# Patient Record
Sex: MALE | Race: WHITE | NOT HISPANIC OR LATINO | Employment: PART TIME | URBAN - METROPOLITAN AREA
[De-identification: names, ages, dates, MRNs, and addresses within clinical notes are randomized per-mention and may not be internally consistent; named-entity substitution may affect disease eponyms.]

---

## 2023-06-22 ENCOUNTER — OFFICE VISIT (OUTPATIENT)
Dept: FAMILY MEDICINE CLINIC | Facility: HOSPITAL | Age: 33
End: 2023-06-22
Payer: COMMERCIAL

## 2023-06-22 VITALS
WEIGHT: 162.6 LBS | BODY MASS INDEX: 22.02 KG/M2 | SYSTOLIC BLOOD PRESSURE: 120 MMHG | HEIGHT: 72 IN | HEART RATE: 66 BPM | OXYGEN SATURATION: 97 % | TEMPERATURE: 97.7 F | DIASTOLIC BLOOD PRESSURE: 80 MMHG

## 2023-06-22 DIAGNOSIS — M25.562 ACUTE PAIN OF LEFT KNEE: ICD-10-CM

## 2023-06-22 DIAGNOSIS — Z00.00 ANNUAL PHYSICAL EXAM: Primary | ICD-10-CM

## 2023-06-22 DIAGNOSIS — E04.9 ENLARGED THYROID: ICD-10-CM

## 2023-06-22 DIAGNOSIS — R10.11 ABDOMINAL PRESSURE IN RIGHT UPPER QUADRANT: ICD-10-CM

## 2023-06-22 DIAGNOSIS — Z13.220 SCREENING CHOLESTEROL LEVEL: ICD-10-CM

## 2023-06-22 PROCEDURE — 99385 PREV VISIT NEW AGE 18-39: CPT | Performed by: NURSE PRACTITIONER

## 2023-06-22 NOTE — PROGRESS NOTES
[facility] [address] [address]     NAME: Brandon Dozier  AGE: 29 y o  SEX: male  : 1990     DATE: 2023     Assessment and Plan:     Problem List Items Addressed This Visit    None  Visit Diagnoses     Annual physical exam    -  Primary    Enlarged thyroid        unilateral enlargement vs mass  Check US thyroid  Relevant Orders    US thyroid    CBC and differential    Comprehensive metabolic panel    TSH, 3rd generation with Free T4 reflex    Acute pain of left knee        unprovoked right knee pain and swelling  Mulitple tick bites  Check lyme and AI blood work  Relevant Orders    ERIK Screen w/ Reflex to Titer/Pattern    CBC and differential    Comprehensive metabolic panel    Lyme Total Antibody Profile with reflex to WB    Rheumatoid Arthritis Diagnostic Panel 1    ERIK Screen w/ Reflex to Titer/Pattern    Abdominal pressure in right upper quadrant        Will watch and wait  Exam normal  may need imaging  Screening cholesterol level        Relevant Orders    Lipid panel          Immunizations and preventive care screenings were discussed with patient today  Appropriate education was printed on patient's after visit summary  Counseling:  Alcohol/drug use: discussed moderation in alcohol intake, the recommendations for healthy alcohol use, and avoidance of illicit drug use  Dental Health: discussed importance of regular tooth brushing, flossing, and dental visits  Injury prevention: discussed safety/seat belts, safety helmets, smoke detectors, carbon dioxide detectors, and smoking near bedding or upholstery  Sexual health: discussed sexually transmitted diseases, partner selection, use of condoms, avoidance of unintended pregnancy, and contraceptive alternatives  · Exercise: the importance of regular exercise/physical activity was discussed  Recommend exercise 3-5 times per week for at least 30 minutes  Depression Screening and Follow-up Plan: Patient was screened for depression during today's encounter  They screened negative with a PHQ-2 score of 0  Pt had vasovagal episode after examining thyroid gland  Lightheaded, N/V/diaphoresis  He was back to baseline within 10 minutes  No follow-ups on file  Chief Complaint:     Chief Complaint   Patient presents with   • Establish Care   • Joint Swelling      History of Present Illness:     Adult Annual Physical   New Patient here for a comprehensive physical exam  The patient reports   Last month woke up with right knee swelling and unable to walk  There was no pain  3-4 weeks later it went away  Now has lymph node swelling left side of neck that started a few weeks ago  Has removed ticks from himself  Still with some left knee achiness  No fever, chills, night sweats  Thinks he may have lost some unintentionally  No other joint pain or swelling  Occasional sharp pain in head  No rash  Has some mild RUQ abdominal pressure  No N/V/D  Appetite is good  No sore throat, nasal congestion, runny nose  Denies seasonal allergies  No cough  Diet and Physical Activity  · Diet/Nutrition: well balanced diet  · Exercise: walking  Depression Screening  PHQ-2/9 Depression Screening    Little interest or pleasure in doing things: 0 - not at all  Feeling down, depressed, or hopeless: 0 - not at all  PHQ-2 Score: 0  PHQ-2 Interpretation: Negative depression screen       General Health  · Sleep: sleeps well  · Hearing: normal - bilateral   · Vision: no vision problems  · Dental: no dental visits for >1 year   Health  · History of STDs?: no      Review of Systems:     Review of Systems   Constitutional: Positive for unexpected weight change  Negative for activity change, appetite change, chills, diaphoresis, fatigue and fever  HENT: Negative  Eyes: Negative  Respiratory: Negative  Cardiovascular: Negative      Gastrointestinal: Positive for abdominal pain  Negative for constipation, diarrhea, nausea and vomiting  Endocrine: Negative  Genitourinary: Negative  Musculoskeletal: Positive for arthralgias and joint swelling  Negative for back pain, gait problem, myalgias, neck pain and neck stiffness  Skin: Negative  Negative for rash  Neurological: Negative  Hematological: Negative  Psychiatric/Behavioral: Negative  Past Medical History:     History reviewed  No pertinent past medical history  Past Surgical History:     History reviewed  No pertinent surgical history  Social History:     Social History     Socioeconomic History   • Marital status: Single     Spouse name: None   • Number of children: None   • Years of education: None   • Highest education level: None   Occupational History   • None   Tobacco Use   • Smoking status: Never   • Smokeless tobacco: Never   Vaping Use   • Vaping Use: Never used   Substance and Sexual Activity   • Alcohol use: Not Currently   • Drug use: Yes     Types: Marijuana     Comment: occasional   • Sexual activity: None   Other Topics Concern   • None   Social History Narrative   • None     Social Determinants of Health     Financial Resource Strain: Not on file   Food Insecurity: Not on file   Transportation Needs: Not on file   Physical Activity: Not on file   Stress: Not on file   Social Connections: Not on file   Intimate Partner Violence: Not on file   Housing Stability: Not on file      Family History:     Family History   Problem Relation Age of Onset   • No Known Problems Mother    • No Known Problems Father    • Celiac disease Maternal Aunt       Current Medications:     No current outpatient medications on file  No current facility-administered medications for this visit        Allergies:     No Known Allergies   Physical Exam:     /80 (BP Location: Left arm, Patient Position: Sitting, Cuff Size: Standard)   Pulse 66   Temp 97 7 °F (36 5 °C) (Tympanic)   Ht 6' (1 829 m) Wt 73 8 kg (162 lb 9 6 oz)   SpO2 97%   BMI 22 05 kg/m²     Physical Exam  Vitals reviewed  Constitutional:       Appearance: Normal appearance  He is normal weight  HENT:      Head: Normocephalic and atraumatic  Right Ear: Tympanic membrane, ear canal and external ear normal       Left Ear: Tympanic membrane, ear canal and external ear normal       Nose: Nose normal       Mouth/Throat:      Mouth: Mucous membranes are moist       Pharynx: Oropharynx is clear  Eyes:      Conjunctiva/sclera: Conjunctivae normal       Pupils: Pupils are equal, round, and reactive to light  Neck:      Thyroid: Thyromegaly (left side) present  No thyroid tenderness  Cardiovascular:      Rate and Rhythm: Normal rate and regular rhythm  Heart sounds: Normal heart sounds  No murmur heard  Pulmonary:      Effort: Pulmonary effort is normal       Breath sounds: Normal breath sounds  Abdominal:      General: Abdomen is flat  Bowel sounds are normal       Palpations: Abdomen is soft  There is no hepatomegaly or splenomegaly  Tenderness: There is no abdominal tenderness  Musculoskeletal:         General: Normal range of motion  Cervical back: Normal range of motion and neck supple  Lymphadenopathy:      Cervical: No cervical adenopathy  Skin:     General: Skin is warm and dry  Capillary Refill: Capillary refill takes less than 2 seconds  Neurological:      General: No focal deficit present  Mental Status: He is alert and oriented to person, place, and time  Psychiatric:         Mood and Affect: Mood normal          Behavior: Behavior normal          Thought Content:  Thought content normal          Judgment: Judgment normal           Valentine Crew, [address] [place] [address] [id number]

## 2023-06-27 DIAGNOSIS — A69.20 LYME DISEASE: Primary | ICD-10-CM

## 2023-06-27 LAB
ALBUMIN SERPL-MCNC: 5.1 G/DL (ref 3.6–5.1)
ALBUMIN/GLOB SERPL: 1.7 (CALC) (ref 1–2.5)
ALP SERPL-CCNC: 48 U/L (ref 36–130)
ALT SERPL-CCNC: 13 U/L (ref 9–46)
ANA SER QL IF: NEGATIVE
AST SERPL-CCNC: 14 U/L (ref 10–40)
B BURGDOR AB SER QL IA: 7.82 INDEX
B BURGDOR IGG SER QL IB: POSITIVE
B BURGDOR IGM SER QL IB: NEGATIVE
B BURGDOR18KD IGG SER QL IB: REACTIVE
B BURGDOR23KD IGG SER QL IB: ABNORMAL
B BURGDOR23KD IGM SER QL IB: ABNORMAL
B BURGDOR28KD IGG SER QL IB: REACTIVE
B BURGDOR30KD IGG SER QL IB: REACTIVE
B BURGDOR39KD IGG SER QL IB: REACTIVE
B BURGDOR39KD IGM SER QL IB: ABNORMAL
B BURGDOR41KD IGG SER QL IB: REACTIVE
B BURGDOR41KD IGM SER QL IB: ABNORMAL
B BURGDOR45KD IGG SER QL IB: REACTIVE
B BURGDOR58KD IGG SER QL IB: REACTIVE
B BURGDOR66KD IGG SER QL IB: REACTIVE
B BURGDOR93KD IGG SER QL IB: REACTIVE
BASOPHILS # BLD AUTO: 51 CELLS/UL (ref 0–200)
BASOPHILS NFR BLD AUTO: 0.8 %
BILIRUB SERPL-MCNC: 0.5 MG/DL (ref 0.2–1.2)
BUN SERPL-MCNC: 18 MG/DL (ref 7–25)
BUN/CREAT SERPL: ABNORMAL (CALC) (ref 6–22)
CALCIUM SERPL-MCNC: 10 MG/DL (ref 8.6–10.3)
CCP IGG SERPL-ACNC: <16 UNITS
CHLORIDE SERPL-SCNC: 101 MMOL/L (ref 98–110)
CHOLEST SERPL-MCNC: 169 MG/DL
CHOLEST/HDLC SERPL: 2.5 (CALC)
CO2 SERPL-SCNC: 27 MMOL/L (ref 20–32)
CREAT SERPL-MCNC: 0.88 MG/DL (ref 0.6–1.26)
EOSINOPHIL # BLD AUTO: 154 CELLS/UL (ref 15–500)
EOSINOPHIL NFR BLD AUTO: 2.4 %
ERYTHROCYTE [DISTWIDTH] IN BLOOD BY AUTOMATED COUNT: 13.7 % (ref 11–15)
GFR/BSA.PRED SERPLBLD CYS-BASED-ARV: 119 ML/MIN/1.73M2
GLOBULIN SER CALC-MCNC: 3 G/DL (CALC) (ref 1.9–3.7)
GLUCOSE SERPL-MCNC: 102 MG/DL (ref 65–99)
HCT VFR BLD AUTO: 44 % (ref 38.5–50)
HDLC SERPL-MCNC: 67 MG/DL
HGB BLD-MCNC: 14.6 G/DL (ref 13.2–17.1)
LDLC SERPL CALC-MCNC: 91 MG/DL (CALC)
LYMPHOCYTES # BLD AUTO: 2086 CELLS/UL (ref 850–3900)
LYMPHOCYTES NFR BLD AUTO: 32.6 %
MCH RBC QN AUTO: 27.5 PG (ref 27–33)
MCHC RBC AUTO-ENTMCNC: 33.2 G/DL (ref 32–36)
MCV RBC AUTO: 83 FL (ref 80–100)
MONOCYTES # BLD AUTO: 525 CELLS/UL (ref 200–950)
MONOCYTES NFR BLD AUTO: 8.2 %
NEUTROPHILS # BLD AUTO: 3584 CELLS/UL (ref 1500–7800)
NEUTROPHILS NFR BLD AUTO: 56 %
NONHDLC SERPL-MCNC: 102 MG/DL (CALC)
PLATELET # BLD AUTO: 236 THOUSAND/UL (ref 140–400)
PMV BLD REES-ECKER: 11 FL (ref 7.5–12.5)
POTASSIUM SERPL-SCNC: 4.7 MMOL/L (ref 3.5–5.3)
PROT SERPL-MCNC: 8.1 G/DL (ref 6.1–8.1)
RBC # BLD AUTO: 5.3 MILLION/UL (ref 4.2–5.8)
RHEUMATOID FACT SERPL-ACNC: <14 IU/ML
SODIUM SERPL-SCNC: 138 MMOL/L (ref 135–146)
TRIGL SERPL-MCNC: 39 MG/DL
TSH SERPL-ACNC: 0.95 MIU/L (ref 0.4–4.5)
WBC # BLD AUTO: 6.4 THOUSAND/UL (ref 3.8–10.8)

## 2023-06-27 RX ORDER — DOXYCYCLINE 100 MG/1
100 CAPSULE ORAL 2 TIMES DAILY
Qty: 42 CAPSULE | Refills: 0 | Status: SHIPPED | OUTPATIENT
Start: 2023-06-27 | End: 2023-07-18

## 2023-06-29 ENCOUNTER — HOSPITAL ENCOUNTER (OUTPATIENT)
Dept: ULTRASOUND IMAGING | Facility: HOSPITAL | Age: 33
End: 2023-06-29
Payer: COMMERCIAL

## 2023-06-29 DIAGNOSIS — E04.9 ENLARGED THYROID: ICD-10-CM

## 2023-06-29 PROCEDURE — 76536 US EXAM OF HEAD AND NECK: CPT

## 2023-07-10 DIAGNOSIS — E04.2 MULTIPLE THYROID NODULES: Primary | ICD-10-CM

## 2023-08-17 ENCOUNTER — TELEPHONE (OUTPATIENT)
Dept: FAMILY MEDICINE CLINIC | Facility: HOSPITAL | Age: 33
End: 2023-08-17

## 2023-08-17 ENCOUNTER — HOSPITAL ENCOUNTER (OUTPATIENT)
Dept: RADIOLOGY | Facility: HOSPITAL | Age: 33
Discharge: HOME | End: 2023-08-17
Payer: COMMERCIAL

## 2023-08-17 DIAGNOSIS — E04.2 MULTIPLE THYROID NODULES: Primary | ICD-10-CM

## 2023-08-17 DIAGNOSIS — E04.2 MULTIPLE THYROID NODULES: ICD-10-CM

## 2023-08-17 PROCEDURE — 10005 FNA BX W/US GDN 1ST LES: CPT

## 2023-08-17 PROCEDURE — 88173 CYTOPATH EVAL FNA REPORT: CPT | Performed by: PATHOLOGY

## 2023-08-17 RX ORDER — LIDOCAINE HYDROCHLORIDE 10 MG/ML
5 INJECTION, SOLUTION EPIDURAL; INFILTRATION; INTRACAUDAL; PERINEURAL ONCE
Status: COMPLETED | OUTPATIENT
Start: 2023-08-17 | End: 2023-08-17

## 2023-08-17 RX ADMIN — LIDOCAINE HYDROCHLORIDE 5 ML: 10 INJECTION, SOLUTION EPIDURAL; INFILTRATION; INTRACAUDAL; PERINEURAL at 10:45

## 2023-08-17 NOTE — TELEPHONE ENCOUNTER
----- Message from Lillie Ga, [address] [facility] sent at 8/17/2023 12:09 PM EDT -----  Please call pt and let him know I made a referral for our IR department to obtain thyroid biopsy with sedation since he was unable to tolerate the attempted biopsy. They should be calling him to schedule.

## 2023-08-17 NOTE — TELEPHONE ENCOUNTER
PATIENT AWARE IR WILL CALL HIM TO MAKE APPT. PATIENT WILL CALL US BACK IF THEY DONT REACH OUT TO HIM.

## 2023-08-21 ENCOUNTER — PREP FOR PROCEDURE (OUTPATIENT)
Dept: INTERVENTIONAL RADIOLOGY/VASCULAR | Facility: CLINIC | Age: 33
End: 2023-08-21

## 2023-08-21 DIAGNOSIS — E04.1 THYROID NODULE: Primary | ICD-10-CM

## 2023-08-21 PROCEDURE — 88173 CYTOPATH EVAL FNA REPORT: CPT | Performed by: PATHOLOGY

## 2023-09-11 ENCOUNTER — TELEPHONE (OUTPATIENT)
Dept: INTERVENTIONAL RADIOLOGY/VASCULAR | Facility: HOSPITAL | Age: 33
End: 2023-09-11

## 2023-09-12 ENCOUNTER — TELEPHONE (OUTPATIENT)
Dept: INTERVENTIONAL RADIOLOGY/VASCULAR | Facility: HOSPITAL | Age: 33
End: 2023-09-12

## 2023-09-21 ENCOUNTER — HOSPITAL ENCOUNTER (OUTPATIENT)
Dept: INTERVENTIONAL RADIOLOGY/VASCULAR | Facility: HOSPITAL | Age: 33
Discharge: HOME | End: 2023-09-21
Attending: RADIOLOGY
Payer: COMMERCIAL

## 2023-09-21 VITALS
SYSTOLIC BLOOD PRESSURE: 116 MMHG | TEMPERATURE: 98 F | HEART RATE: 70 BPM | BODY MASS INDEX: 21.94 KG/M2 | OXYGEN SATURATION: 100 % | DIASTOLIC BLOOD PRESSURE: 75 MMHG | RESPIRATION RATE: 12 BRPM | WEIGHT: 162 LBS | HEIGHT: 72 IN

## 2023-09-21 DIAGNOSIS — E04.1 THYROID NODULE: ICD-10-CM

## 2023-09-21 PROCEDURE — 10005 FNA BX W/US GDN 1ST LES: CPT | Performed by: RADIOLOGY

## 2023-09-21 PROCEDURE — 88172 CYTP DX EVAL FNA 1ST EA SITE: CPT | Performed by: PATHOLOGY

## 2023-09-21 PROCEDURE — 99152 MOD SED SAME PHYS/QHP 5/>YRS: CPT

## 2023-09-21 PROCEDURE — 99152 MOD SED SAME PHYS/QHP 5/>YRS: CPT | Performed by: RADIOLOGY

## 2023-09-21 PROCEDURE — 99153 MOD SED SAME PHYS/QHP EA: CPT

## 2023-09-21 PROCEDURE — 10006 FNA BX W/US GDN EA ADDL: CPT

## 2023-09-21 PROCEDURE — 10006 FNA BX W/US GDN EA ADDL: CPT | Performed by: RADIOLOGY

## 2023-09-21 PROCEDURE — 88173 CYTOPATH EVAL FNA REPORT: CPT | Performed by: PATHOLOGY

## 2023-09-21 PROCEDURE — 10005 FNA BX W/US GDN 1ST LES: CPT

## 2023-09-21 RX ORDER — MIDAZOLAM HYDROCHLORIDE 2 MG/2ML
INJECTION, SOLUTION INTRAMUSCULAR; INTRAVENOUS AS NEEDED
Status: COMPLETED | OUTPATIENT
Start: 2023-09-21 | End: 2023-09-21

## 2023-09-21 RX ORDER — FENTANYL CITRATE 50 UG/ML
INJECTION, SOLUTION INTRAMUSCULAR; INTRAVENOUS AS NEEDED
Status: COMPLETED | OUTPATIENT
Start: 2023-09-21 | End: 2023-09-21

## 2023-09-21 RX ORDER — DIPHENHYDRAMINE HYDROCHLORIDE 50 MG/ML
INJECTION INTRAMUSCULAR; INTRAVENOUS AS NEEDED
Status: COMPLETED | OUTPATIENT
Start: 2023-09-21 | End: 2023-09-21

## 2023-09-21 RX ORDER — LIDOCAINE HYDROCHLORIDE 10 MG/ML
INJECTION, SOLUTION EPIDURAL; INFILTRATION; INTRACAUDAL; PERINEURAL AS NEEDED
Status: COMPLETED | OUTPATIENT
Start: 2023-09-21 | End: 2023-09-21

## 2023-09-21 RX ADMIN — MIDAZOLAM 2 MG: 1 INJECTION INTRAMUSCULAR; INTRAVENOUS at 10:53

## 2023-09-21 RX ADMIN — FENTANYL CITRATE 100 MCG: 50 INJECTION, SOLUTION INTRAMUSCULAR; INTRAVENOUS at 10:44

## 2023-09-21 RX ADMIN — LIDOCAINE HYDROCHLORIDE 5 ML: 10 INJECTION, SOLUTION EPIDURAL; INFILTRATION; INTRACAUDAL; PERINEURAL at 10:45

## 2023-09-21 RX ADMIN — MIDAZOLAM 2 MG: 1 INJECTION INTRAMUSCULAR; INTRAVENOUS at 10:44

## 2023-09-21 RX ADMIN — DIPHENHYDRAMINE HYDROCHLORIDE 50 MG: 50 INJECTION INTRAMUSCULAR; INTRAVENOUS at 10:44

## 2023-09-21 NOTE — SEDATION DOCUMENTATION
Thyroid bx completed and patient tolerated well with IV conscious sedation. AAOx3 and stable for transfer to PACU. Report and care given to primary RN.

## 2023-09-21 NOTE — BRIEF OP NOTE (RAD/CATH)
IR BIOPSY THYROID WITH MOLECULAR TESTING  Procedure Note    PATIENT NAME: Taina Espinal  : 1990  MRN: [id number]     Pre-op Diagnosis:   1. Thyroid nodule      Post-op Diagnosis:   1. Thyroid nodule        Surgeon:   Myrna Billy DO  Assistants:     No qualified resident was available.     Estimated Blood Loss: None  Findings:   Dominant R and L lower pole nodules targeted - FNA    Specimens: right and left lobe lower pole nodules    Complications:  none    Anesthesia: conscious sedation and local    Myrna Billy DO     Date: 2023  Time: 11:24 AM

## 2023-09-21 NOTE — DISCHARGE INSTRUCTIONS
thyroid Aspiration      WHAT YOU NEED TO KNOW:     Today you underwent a thyroi aspiration. Usually the tissue is sent to the lab for analysis. You may have some pain associated with the puncture site. The Procedure is performed with Local anesthesia (Lidocaine) and sometimes with local and IV Sedation. After you go Home:    Home care  These tips can help your wound heal:   Cover the wound with a clean dry dressing. You may use acetaminophen or ibuprofen to control pain, unless another pain medicine was prescribed. If you have liver disease or ever had a stomach ulcer, talk with your doctor before using these medicines. Follow-up care  Follow up with your healthcare provider, or as advised. Check your wound every day for any signs that the infection is getting worse. The signs are listed below. When to seek medical advice  Call your healthcare provider right away if any of these occur:   Increasing redness or swelling  Red streaks in the skin leading away from the wound  Increasing local pain or swelling  Continued pus draining from the wound 2 days after treatment  Fever of 100.4ºF (38ºC) or higher, or as directed by your healthcare provider  Abscess  returns when you are at home

## 2023-09-21 NOTE — H&P
Interventional Radiology  History and Physical 9/21/2023     Taina Espinal   1990   [id number]    Assessment/Plan:  Thyroid FNA    Problem List Items Addressed This Visit    None  Visit Diagnoses     Thyroid nodule        Relevant Orders    IR biopsy thyroid with [id number]             Subjective:     Patient ID: Taina Espinal is a 34 y.o. male. History of Present Illness  Thyroid nodules, no complaints. Doesn't want all 3 sampled, still has pain from attempted FNA. Review of Systems   All other systems reviewed and are negative. History reviewed. No pertinent past medical history. Past Surgical History:   Procedure Laterality Date   • US GUIDED THYROID BIOPSY  8/17/2023        Social History     Tobacco Use   Smoking Status Never   Smokeless Tobacco Never        Social History     Substance and Sexual Activity   Alcohol Use Not Currently        Social History     Substance and Sexual Activity   Drug Use Yes   • Types: Marijuana    Comment: occasional        No Known Allergies    No current outpatient medications on file. No current facility-administered medications for this encounter. Objective:    Vitals:    09/21/23 0937 09/21/23 0942 09/21/23 1025   BP:  119/80 124/80   Pulse:  77 77   Resp:  18 17   Temp:  97.8 °F (36.6 °C)    TempSrc:  Temporal    SpO2:  100% 100%   Weight: 73.5 kg (162 lb)     Height: 6' (1.829 m)          Physical Exam  HENT:      Head: Normocephalic. Eyes:      Pupils: Pupils are equal, round, and reactive to light. Cardiovascular:      Rate and Rhythm: Normal rate. Pulmonary:      Effort: Pulmonary effort is normal.   Abdominal:      General: Abdomen is flat. Musculoskeletal:         General: Normal range of motion. Neurological:      Mental Status: He is alert and oriented to person, place, and time.    Psychiatric:         Mood and Affect: Mood normal.           No results found for: "BNP"   Lab Results   Component Value Date    WBC 6.4 06/24/2023    HGB 14.6 06/24/2023    HCT 44.0 06/24/2023    MCV 83.0 06/24/2023     06/24/2023     No results found for: "INR", "PROTIME"  No results found for: "PTT"      I have personally reviewed pertinent imaging and laboratory results. Code Status: No Order  Advance Directive and Living Will:      Power of :    POLST:      This text is generated with voice recognition software. There may be translation, syntax,  or grammatical errors. If you have any questions, please contact the dictating provider.

## 2023-09-25 ENCOUNTER — TELEPHONE (OUTPATIENT)
Dept: FAMILY MEDICINE CLINIC | Facility: HOSPITAL | Age: 33
End: 2023-09-25

## 2023-09-25 DIAGNOSIS — E04.2 MULTIPLE THYROID NODULES: Primary | ICD-10-CM

## 2023-09-25 DIAGNOSIS — R89.9 ABNORMAL THYROID BIOPSY: ICD-10-CM

## 2023-09-25 PROCEDURE — 88172 CYTP DX EVAL FNA 1ST EA SITE: CPT | Performed by: PATHOLOGY

## 2023-09-25 PROCEDURE — 88173 CYTOPATH EVAL FNA REPORT: CPT | Performed by: PATHOLOGY

## 2023-10-24 ENCOUNTER — CONSULT (OUTPATIENT)
Dept: ENDOCRINOLOGY | Facility: HOSPITAL | Age: 33
End: 2023-10-24
Payer: COMMERCIAL

## 2023-10-24 VITALS
BODY MASS INDEX: 21.05 KG/M2 | WEIGHT: 155.4 LBS | HEART RATE: 83 BPM | DIASTOLIC BLOOD PRESSURE: 80 MMHG | HEIGHT: 72 IN | SYSTOLIC BLOOD PRESSURE: 120 MMHG

## 2023-10-24 DIAGNOSIS — R89.9 ABNORMAL THYROID BIOPSY: ICD-10-CM

## 2023-10-24 DIAGNOSIS — E04.2 MULTIPLE THYROID NODULES: ICD-10-CM

## 2023-10-24 PROCEDURE — 99244 OFF/OP CNSLTJ NEW/EST MOD 40: CPT | Performed by: INTERNAL MEDICINE

## 2023-10-24 NOTE — PROGRESS NOTES
10/24/2023    Assessment/Plan      Diagnoses and all orders for this visit:    Multiple thyroid nodules  -     Ambulatory Referral to Endocrinology  -     Thyroid stimulating immunoglobulin Lab Collect  -     Thyroid Peroxidase and Thyroglobulin Antibodies  -     Ambulatory referral to Surgical Oncology; Future    Abnormal thyroid biopsy  -     Ambulatory Referral to Endocrinology  -     Ambulatory referral to Surgical Oncology; Future        1. Multiple thyroid nodules. He has bilateral thyroid nodules of which 3 of them were biopsied and the 2 lower lobe nodules in the right and the left lobe both were Lynndyl class III or IV with Afirma testing that was suspicious for malignancy. At this point, the next step is surgical removal/thyroidectomy. I have asked him to get thyroid peroxidase antibodies, antithyroglobulin antibodies, and thyroid binding immunoglobulins performed just for my information and with a baseline thyroglobulin level. Given his family history of hyperthyroidism, I have referred him to surgical oncology with Dr. Yamilex Allen for surgical removal of his thyroid. He was informed that he will follow up with me post the surgery. All questions were answered regarding the testing and results. I have spent a total time of 45 minutes on 10/24/23 in caring for this patient including Diagnostic results, Prognosis, Risks and benefits of tx options, Instructions for management, Patient and family education, Importance of tx compliance, Risk factor reductions, Impressions, Counseling / Coordination of care, Documenting in the medical record, Reviewing / ordering tests, medicine, procedures  , and Obtaining or reviewing history  . CC: Multiple thyroid nodules and history of Lyme's disease. HPI: Frederick Palomo is a 82-year-old male with history of multiple thyroid nodules, Lyme' s disease, who presents for thyroid consult. The patient states he developed swelling in his right knee around Summer. He missed work for a week thinking he had symptoms due to runner's knee, later, he did some research and questioned if it was due to Lyme's disease as he had ticks on him earlier. He had consultation with his primary care doctor in 06/2023 who after physical exam informed him about right anterior cervical submandibular lymph node. He states that it was confirmed that he had Lyme's disease and after utilizing antibiotics his knee symptoms resolved. He experienced mild soreness for a month which has now resolved. He reports he can run now with his dog; however, he still occasionally feels pressure in his neck. His primary care doctor also performed ultrasound. He reports he felt left-sided pressure in his neck 2 weeks after his consultation. A biopsy was performed in 09/2023 which he correlates in alleviating the left-sided pressure. He notes that the size of his nodule remained stable since biopsy. Today, he reports absence of pain and pressure on his neck except for swelling. He denies dysphagia, food getting stuck in his throat or dyspnea in supine position after biopsy. He did not undergo any radiation therapy to his neck or head in the past. He had x-rays for dental purposes. He denies heat or cold intolerance. No diarrhea or constipation noted. He reports mild occasional abdominal discomfort on the right upper quadrant since he was young. He confirms his condition remained undiagnosed, all his blood works showed normal findings, and his doctors advised him to monitor it. He denies tremors. He denies xeroderma or nail brittleness. He states he has had more than usual hair loss since the past year, and he believes his age is the contributory factor. He also denies insomnia. He mentions that there are days when he feels fatigued or lethargic which he thinks is due to Lyme's disease; however, he states these past 2 weeks have been the best he had in the couple of months. No complaints of anxiety or depression. His weight has reduced by 20 pounds in the last 2 years. He denies diplopia or blur vision, headaches, lightheadedness, dizziness, or hearing problems. Family History. His grandmother had undergone partial thyroidectomy. His uncle on the maternal side has thyroid issues. His aunt on the maternal side has hyperthyroidism. Review of Systems  The pertinent positive and negative findings are as noted in the HPI. Historical Information   Past Medical History:   Diagnosis Date    Lyme disease 06/2023     Past Surgical History:   Procedure Laterality Date    IR BIOPSY ABDOMEN  09/21/2023    US GUIDED THYROID BIOPSY  08/17/2023    WISDOM TOOTH EXTRACTION       Social History   Social History     Substance and Sexual Activity   Alcohol Use Not Currently     Social History     Substance and Sexual Activity   Drug Use Yes    Types: Marijuana    Comment: occasional     Social History     Tobacco Use   Smoking Status Never   Smokeless Tobacco Never     Family History:   Family History   Problem Relation Age of Onset    No Known Problems Mother     No Known Problems Father     No Known Problems Brother     Thyroid disease unspecified Maternal Aunt         maybe overactive    Celiac disease Maternal Aunt     Thyroid disease unspecified Maternal Uncle     Thyroid disease unspecified Maternal Grandmother         1/2 thyroid removed       Meds/Allergies   No current outpatient medications on file. No current facility-administered medications for this visit. No Known Allergies    Objective   Vitals: Blood pressure 120/80, pulse 83, height 6' (1.829 m), weight 70.5 kg (155 lb 6.4 oz). Invasive Devices       None                   Physical Exam  Physical exam normal except for pertinent positives and negatives. HEENT: No lid lags, stare, proptosis, or periorbital edema. Neck exam demonstrates a right lobe of the thyroid that is normal in size without palpable nodules.  The left lobe of the thyroid is notable for a left lower thyroid nodule measuring 3 to 4 cm that is tender and causes pressure to palpation. There is a 1 cm right anterior cervical submandibular lymph node palpable that is freely movable and tender. Lungs: Clear. Heart: Regular without murmurs. Neurological: No tremor of the outstretched hands. Patellar deep tendon reflexes normal.    The history was obtained from the review of the chart and from the patient. Lab Results:          Lab Results   Component Value Date    CREATININE 0.88 06/24/2023    BUN 18 06/24/2023    K 4.7 06/24/2023     06/24/2023    CO2 27 06/24/2023     eGFR   Date Value Ref Range Status   06/24/2023 119 > OR = 60 mL/min/1.73m2 Final     Comment:     The eGFR is based on the CKD-EPI 2021 equation. To calculate   the new eGFR from a previous Creatinine or Cystatin C  result, go to [email or web address]  [email or web address]           Lab Results   Component Value Date    HDL 67 06/24/2023    TRIG 39 06/24/2023       Lab Results   Component Value Date    ALT 13 06/24/2023    AST 14 06/24/2023    ALKPHOS 48 06/24/2023       Blood work performed on 06/24/2023 showed a TSH of 0.95 mIU/L. Thyroid ultrasound:    THYROID ULTRASOUND performed on 6/29/2023     INDICATION:    E04.9: Nontoxic goiter, unspecified. COMPARISON:  None     TECHNIQUE:   Ultrasound of the thyroid was performed with a high frequency linear transducer in transverse and sagittal planes including volumetric imaging sweeps as well as traditional still imaging technique. FINDINGS:  Normal homogeneous smooth echotexture. Right lobe: 6.0 x 1.8 x 1.7 cm. Volume 8.7 mL  Left lobe:  5.7 x 2.6 x 3.1 cm. Volume 21.8 mL  Isthmus: 0.3  cm. Nodule #1. Image 7. RIGHT midgland nodule measuring 0.6 x 0.7 x 0.7 cm. COMPOSITION:  2 points, solid or almost completely solid . ECHOGENICITY:  2 points, hypoechoic. SHAPE:  3 points, taller-than-wide. MARGIN: 0 points, smooth.   ECHOGENIC FOCI:  3 points, punctate echogenic foci. TI-RADS Classification: TR 5 (7 or > points), Highly suspicious. FNA if > 1 cm. Follow if > 0.5 cm. Nodule #2. Image 14. RIGHT lower pole nodule measuring 1.8 x 1.4 x 1.2 cm. COMPOSITION:  2 points, solid or almost completely solid . ECHOGENICITY:  1 point, hyperechoic or isoechoic. SHAPE:  0 points, wider-than-tall. MARGIN: 0 points, smooth. ECHOGENIC FOCI:  0 points, none or large comet-tail artifacts. TI-RADS Classification: TR 3 (3 points), FNA if >2.5 cm. Follow if >1.5 cm. Nodule #3. Image 40. Nodule measuring 3.2 x 2.6 x 2.7 cm. COMPOSITION: 2 points, solid or almost completely solid. ECHOGENICITY:  1 point, hyperechoic or isoechoic. SHAPE: 0 points, wider-than-tall. MARGIN: 0 points, smooth. ECHOGENIC FOCI:  0 points, none or large comet-tail artifacts. TI-RADS Classification: TR 3 (3 points), FNA if >2.5 cm. Follow if >1.5 cm. IMPRESSION:  Left lower pole thyroid nodule meets current ACR criteria for recommending ultrasound-guided biopsy. The 2 nodules in the right lobe warrant 1 year follow-up exam. However, given the suspicious features of the right mid gland nodule, consideration for biopsy of this nodule as well would be reasonable. Pathology of fine-needle aspiration biopsy of right and left thyroid nodules performed on 9/21/2023:    Final Diagnosis   A.B. Thyroid, Right, lower ( ThinPrep and smear preparations ):  Atypia of undetermined significance (Adams Category III) - See note. Atypical follicular cells with nuclear crowding , clearing, grooves and some overlapping. Some clusters are forming microfollicles. Thick Colloid is present. Satisfactory for evaluation. Note:  (1) As reported in the [facility] for Reporting Thyroid Cytopathology*, this diagnostic category has demonstrated anywhere from 10-30% risk of malignancy being found in subsequent resections and/or FNA.   This risk of malignancy is expected to change due to the usage of the surgical pathology diagnosis of “non-invasive follicular thyroid neoplasm with papillary-like nuclear features (NIFTP). ”  The anticipated risk of malignancy secondary to NIFTP is 6-18%. The manual reports that the usual management following this diagnosis is repeat FNA, molecular testing, or lobectomy. Ultimately, clinical/imaging correlation for this patient is needed in arriving at the actual management plan. *The Ethel System for Reporting Thyroid Cytopathology, Chip Patton.), 2018 (2nd ed.)              C. 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 Oncocytic cells showing mild nuclear atypia   The Oncocytic cells are arranged in both crowded and loosely cohesive groups. Colloid is present. Satisfactory for evaluation. Note: As reported in the [address] [facility] for Reporting Thyroid Cytopathology* this diagnostic category has demonstrated anywhere from 10-40% risk of malignancy being found in subsequent resections and/or FNA. The histologic follow-up of cases diagnosed as oncocytic neoplasm/suspicious for oncocytic neoplasm includes adenomatous nodules with oncocytic cell hyperplasia, Hurthle cell adenoma, Hurthle cell carcinoma, and Hurthle cell variant of papillary thyroid carcinoma, among others. The manual reports that the usual management following this diagnosis is genetic testing or lobectomy. Ultimately, clinical/imaging correlation for this patient is needed in arriving at the actual management plan.      *The Ethel System for Reporting Thyroid Cytopathology, Horacio Marlow., Chip Srinivasan.), 2018 (2nd ed.)      Afirma testing on the right and left lower lobe thyroid nodules performed on biopsy 9/21/2023:    Afirma testing on both nodules was notable for being suspicious for malignancy. No future appointments. Transcribed for Dorian Villalpando MD, by Ellyn Hawkins. Ana Artis. on 10/24/23 at 8:05 PM. Powered by [employer].

## 2023-10-24 NOTE — PATIENT INSTRUCTIONS
Let's do thyroid blood work. We nkechi to get you to the surgeon, Dr. Yamilex Allen to get the thyroid removed. Follow up with me after surgery.

## 2023-10-27 ENCOUNTER — TELEPHONE (OUTPATIENT)
Dept: HEMATOLOGY ONCOLOGY | Facility: CLINIC | Age: 33
End: 2023-10-27

## 2023-10-27 NOTE — TELEPHONE ENCOUNTER
I called Meggan Torres in response to a referral that was received for patient to establish care with Surgical Oncology. Outreach was made to schedule a consultation. I left a voicemail explaining the reason for my call and advised patient to call [facility] at [phone]. Another attempt will be made to contact patient.

## 2023-11-01 ENCOUNTER — TELEPHONE (OUTPATIENT)
Dept: HEMATOLOGY ONCOLOGY | Facility: CLINIC | Age: 33
End: 2023-11-01

## 2023-11-01 LAB
THYROGLOB AB SERPL-ACNC: <1 IU/ML
THYROPEROXIDASE AB SERPL-ACNC: 1 IU/ML
TSI SER-ACNC: <89 % BASELINE

## 2023-11-01 NOTE — TELEPHONE ENCOUNTER
I called Paddy Olivas in response to a referral that was received for patient to establish care with Surgical Oncology. Outreach was made to schedule a consultation. I left a voicemail explaining the reason for my call and advised patient to call [facility] at [phone]. Another attempt will be made to contact patient.

## 2023-11-02 ENCOUNTER — DOCUMENTATION (OUTPATIENT)
Dept: HEMATOLOGY ONCOLOGY | Facility: CLINIC | Age: 33
End: 2023-11-02

## 2023-11-02 NOTE — PROGRESS NOTES
Intake received/ Chart reviewed for services completed outside of [facility]    Pathology completed: Yes, 9- & 8-    Imaging completed: Us Thyroid done on 6-    All records needed are in patients chart. No records retrieval needed at this time.

## 2023-11-07 ENCOUNTER — TELEPHONE (OUTPATIENT)
Dept: SURGICAL ONCOLOGY | Facility: CLINIC | Age: 33
End: 2023-11-07

## 2023-12-04 ENCOUNTER — CONSULT (OUTPATIENT)
Dept: SURGICAL ONCOLOGY | Facility: CLINIC | Age: 33
End: 2023-12-04
Payer: COMMERCIAL

## 2023-12-04 VITALS
TEMPERATURE: 99.2 F | WEIGHT: 160 LBS | HEIGHT: 74 IN | DIASTOLIC BLOOD PRESSURE: 74 MMHG | OXYGEN SATURATION: 100 % | SYSTOLIC BLOOD PRESSURE: 138 MMHG | HEART RATE: 69 BPM | BODY MASS INDEX: 20.53 KG/M2

## 2023-12-04 DIAGNOSIS — R89.9 ABNORMAL THYROID BIOPSY: ICD-10-CM

## 2023-12-04 DIAGNOSIS — E04.2 MULTIPLE THYROID NODULES: Primary | ICD-10-CM

## 2023-12-04 PROCEDURE — 99244 OFF/OP CNSLTJ NEW/EST MOD 40: CPT | Performed by: SURGERY

## 2023-12-04 RX ORDER — LEVOTHYROXINE SODIUM 0.12 MG/1
125 TABLET ORAL DAILY
Qty: 30 TABLET | Refills: 3 | Status: SHIPPED | OUTPATIENT
Start: 2023-12-04

## 2023-12-04 RX ORDER — TRAMADOL HYDROCHLORIDE 50 MG/1
50 TABLET ORAL EVERY 6 HOURS PRN
Qty: 10 TABLET | Refills: 0 | Status: SHIPPED | OUTPATIENT
Start: 2023-12-04

## 2023-12-04 NOTE — PROGRESS NOTES
Surgical Oncology Consult       [address] [place] [address] [facility]  [address] [facility] [id number] [phone]    Rachel Claude  1990  [id number]  [address] [facility] Karma Meredith  [address] [facility]  [address] [facility] [address] [address] [phone]    Chief Complaint   Patient presents with    Consult       Assessment/Plan:    No problem-specific Assessment & Plan notes found for this encounter. Diagnoses and all orders for this visit:    Multiple thyroid nodules  -     Ambulatory referral to Surgical Oncology  -     US head neck lymph node mapping; Future  -     levothyroxine (Synthroid) 125 mcg tablet; Take 1 tablet (125 mcg total) by mouth daily    Abnormal thyroid biopsy  -     Ambulatory referral to Surgical Oncology      Advance Care Planning/Advance Directives:  Discussed disease status, cancer treatment plans and/or cancer treatment goals with the patient. Oncology History    No history exists. History of Present Illness: Patient is a 79-year-old man who of has noticed progressive enlarging thyroid nodule. This led to work-up including ultrasound confirming bilateral nodules, 2 of which met criteria for biopsy, with 1 on each side. No prior history of radiation exposure to head or neck area. No personal or family history of any malignancies. Review of Systems   Constitutional: Negative. HENT: Negative. Negative for trouble swallowing and voice change. Eyes: Negative. Respiratory: Negative. Cardiovascular: Negative. Gastrointestinal: Negative. Endocrine: Negative. Genitourinary: Negative. Musculoskeletal: Negative. Skin: Negative. Allergic/Immunologic: Negative. Neurological: Negative. Hematological: Negative. Psychiatric/Behavioral: Negative. All other systems reviewed and are negative.         Patient Active Problem List Diagnosis    Multiple thyroid nodules     Past Medical History:   Diagnosis Date    Lyme disease 06/2023     Past Surgical History:   Procedure Laterality Date    IR BIOPSY ABDOMEN  09/21/2023    US GUIDED THYROID BIOPSY  08/17/2023    WISDOM TOOTH EXTRACTION       Family History   Problem Relation Age of Onset    No Known Problems Mother     No Known Problems Father     No Known Problems Brother     Thyroid disease unspecified Maternal Aunt         maybe overactive    Celiac disease Maternal Aunt     Thyroid disease unspecified Maternal Uncle     Thyroid disease unspecified Maternal Grandmother         1/2 thyroid removed     Social History     Socioeconomic History    Marital status: Single     Spouse name: Not on file    Number of children: Not on file    Years of education: Not on file    Highest education level: Not on file   Occupational History    Not on file   Tobacco Use    Smoking status: Never    Smokeless tobacco: Never   Vaping Use    Vaping Use: Never used   Substance and Sexual Activity    Alcohol use: Not Currently    Drug use: Yes     Types: Marijuana     Comment: occasional    Sexual activity: Not on file   Other Topics Concern    Not on file   Social History Narrative    Not on file     Social Determinants of Health     Financial Resource Strain: Not on file   Food Insecurity: Not on file   Transportation Needs: Not on file   Physical Activity: Not on file   Stress: Not on file   Social Connections: Not on file   Intimate Partner Violence: Not on file   Housing Stability: Not on file       Current Outpatient Medications:     levothyroxine (Synthroid) 125 mcg tablet, Take 1 tablet (125 mcg total) by mouth daily, Disp: 30 tablet, Rfl: 3  No Known Allergies  Vitals:    12/04/23 0907   BP: 138/74   Pulse: 69   Temp: 99.2 °F (37.3 °C)   SpO2: 100%       Physical Exam  Vitals reviewed. Constitutional:       Appearance: Normal appearance. HENT:      Head: Normocephalic and atraumatic.       Right Ear: External ear normal.      Left Ear: External ear normal.   Eyes:      Extraocular Movements: Extraocular movements intact. Pupils: Pupils are equal, round, and reactive to light. Neck:      Comments: Visible and palpable bilateral thyroid nodules, left greater than right  Cardiovascular:      Rate and Rhythm: Normal rate and regular rhythm. Pulses: Normal pulses. Heart sounds: Normal heart sounds. Pulmonary:      Breath sounds: Normal breath sounds. Abdominal:      General: Abdomen is flat. Palpations: Abdomen is soft. Musculoskeletal:         General: Normal range of motion. Cervical back: Normal range of motion and neck supple. No rigidity or tenderness. Lymphadenopathy:      Cervical: No cervical adenopathy. Skin:     General: Skin is warm and dry. Neurological:      General: No focal deficit present. Mental Status: He is alert and oriented to person, place, and time. Psychiatric:         Mood and Affect: Mood normal.         Behavior: Behavior normal.         Thought Content:  Thought content normal.         Judgment: Judgment normal.         Pathology:  Case Report   Non-gynecologic Cytology                          Case: [id number]                                   Authorizing Provider:  FEDERICO Peng         Collected:           09/21/2023 1049               Ordering Location:     [facility]     Received:            09/21/2023 [address] [facility]                                                                                Radiology                                                                     Pathologist:           Torrey Dahl MD                                                       Specimens:   A) - Thyroid, Right, lower                                                                          B) - Thyroid, Right, lower C) - Thyroid, Left, lower                                                                            D) - Thyroid, Left, lower                                                                  Final Diagnosis   A.B. Thyroid, Right, lower ( ThinPrep and smear preparations ):  Atypia of undetermined significance ([place] Category III) - See note. Atypical follicular cells with nuclear crowding , clearing, grooves and some overlapping. Some clusters are forming microfollicles. Thick Colloid is present. Satisfactory for evaluation. Note:  (1) As reported in the [address] [place] [address] for Reporting Thyroid Cytopathology*, this diagnostic category has demonstrated anywhere from 10-30% risk of malignancy being found in subsequent resections and/or FNA. This risk of malignancy is expected to change due to the usage of the surgical pathology diagnosis of “non-invasive follicular thyroid neoplasm with papillary-like nuclear features (NIFTP). ”  The anticipated risk of malignancy secondary to NIFTP is 6-18%. The manual reports that the usual management following this diagnosis is repeat FNA, molecular testing, or lobectomy. Ultimately, clinical/imaging correlation for this patient is needed in arriving at the actual management plan. *The [place] System for Reporting Thyroid Cytopathology, Pilar Wahl.), 2018 (2nd ed.)              C. D.Thyroid, Left, lower ( ThinPrep and smear preparations ):  Follicular neoplasm (oncocytic type)/Suspicious for follicular neoplasm (oncocytic type) ([place] Category IV, oncocytic type) - See note. Oncocytic cells showing mild nuclear atypia   The Oncocytic cells are arranged in both crowded and loosely cohesive groups. Colloid is present. Satisfactory for evaluation.         Note: As reported in the [address] [place] [address] for Reporting Thyroid Cytopathology* this diagnostic category has demonstrated anywhere from 10-40% risk of malignancy being found in subsequent resections and/or FNA. The histologic follow-up of cases diagnosed as oncocytic neoplasm/suspicious for oncocytic neoplasm includes adenomatous nodules with oncocytic cell hyperplasia, Hurthle cell adenoma, Hurthle cell carcinoma, and Hurthle cell variant of papillary thyroid carcinoma, among others. The manual reports that the usual management following this diagnosis is genetic testing or lobectomy. Ultimately, clinical/imaging correlation for this patient is needed in arriving at the actual management plan. *The [place] System for Reporting Thyroid Cytopathology, Tashia Crenshaw., Berta Barbour.), 2018 (2nd ed.)                  Electronically signed by Hira Kitchen MD on 9/25/2023 at 10:42 AM     Both right and left nodules are Afirma suspicious. Labs:  No results found for: "AHO2XREEYJFB", "TSH", "I5PWEGT", "N8WAWRV", "THYROIDAB"        Imaging    THYROID ULTRASOUND     INDICATION:    E04.9: Nontoxic goiter, unspecified. COMPARISON:  None     TECHNIQUE:   Ultrasound of the thyroid was performed with a high frequency linear transducer in transverse and sagittal planes including volumetric imaging sweeps as well as traditional still imaging technique. FINDINGS:  Normal homogeneous smooth echotexture. Right lobe: 6.0 x 1.8 x 1.7 cm. Volume 8.7 mL  Left lobe:  5.7 x 2.6 x 3.1 cm. Volume 21.8 mL  Isthmus: 0.3  cm. Nodule #1. Image 7. RIGHT midgland nodule measuring 0.6 x 0.7 x 0.7 cm. COMPOSITION:  2 points, solid or almost completely solid . ECHOGENICITY:  2 points, hypoechoic. SHAPE:  3 points, taller-than-wide. MARGIN: 0 points, smooth. ECHOGENIC FOCI:  3 points, punctate echogenic foci. TI-RADS Classification: TR 5 (7 or > points), Highly suspicious. FNA if > 1 cm. Follow if > 0.5 cm. Nodule #2. Image 14. RIGHT lower pole nodule measuring 1.8 x 1.4 x 1.2 cm. COMPOSITION:  2 points, solid or almost completely solid .   ECHOGENICITY:  1 point, hyperechoic or isoechoic. SHAPE:  0 points, wider-than-tall. MARGIN: 0 points, smooth. ECHOGENIC FOCI:  0 points, none or large comet-tail artifacts. TI-RADS Classification: TR 3 (3 points), FNA if >2.5 cm. Follow if >1.5 cm. Nodule #3. Image 40. Nodule measuring 3.2 x 2.6 x 2.7 cm. COMPOSITION: 2 points, solid or almost completely solid. ECHOGENICITY:  1 point, hyperechoic or isoechoic. SHAPE: 0 points, wider-than-tall. MARGIN: 0 points, smooth. ECHOGENIC FOCI:  0 points, none or large comet-tail artifacts. TI-RADS Classification: TR 3 (3 points), FNA if >2.5 cm. Follow if >1.5 cm. IMPRESSION:  Left lower pole thyroid nodule meets current ACR criteria for recommending ultrasound-guided biopsy. The 2 nodules in the right lobe warrant 1 year follow-up exam. However, given the suspicious features of the right mid gland nodule, consideration for biopsy of this nodule as well would be reasonable. Reference: ACR Thyroid Imaging, Reporting and Data System (TI-RADS): White Paper of the [place]. J AM Wai Radiol 6174;01:572-077. (additional recommendations based on American Thyroid Association 2015 guidelines.)     The study was marked in EPIC for significant notification. Workstation performed: [id number]  No results found. I reviewed the above laboratory and imaging data. Discussion/Summary: 68-year-old man, bilateral thyroid nodules, suspicious on biopsy. Plan for total thyroidectomy. Rationale for this and low risk and benefits surgically infection, bleeding, nerve injury, hypocalcemia, discussed. All questions answered and consent signed at the visit.

## 2023-12-04 NOTE — H&P (VIEW-ONLY)
Surgical Oncology Consult       [facility]  [address] [facility] [address]  [phone]    Radhames Halina  1990  [id number]  [facility]  [address] [facility] [address]  [phone]    Chief Complaint   Patient presents with    Consult       Assessment/Plan:    No problem-specific Assessment & Plan notes found for this encounter.       Diagnoses and all orders for this visit:    Multiple thyroid nodules  -     Ambulatory referral to Surgical Oncology  -     US head neck lymph node mapping; Future  -     levothyroxine (Synthroid) 125 mcg tablet; Take 1 tablet (125 mcg total) by mouth daily    Abnormal thyroid biopsy  -     Ambulatory referral to Surgical Oncology      Advance Care Planning/Advance Directives:  Discussed disease status, cancer treatment plans and/or cancer treatment goals with the patient.     Oncology History    No history exists.       History of Present Illness: Patient is a 33-year-old man who of has noticed progressive enlarging thyroid nodule.  This led to work-up including ultrasound confirming bilateral nodules, 2 of which met criteria for biopsy, with 1 on each side.  No prior history of radiation exposure to head or neck area.  No personal or family history of any malignancies.    Review of Systems   Constitutional: Negative.    HENT: Negative.  Negative for trouble swallowing and voice change.    Eyes: Negative.    Respiratory: Negative.     Cardiovascular: Negative.    Gastrointestinal: Negative.    Endocrine: Negative.    Genitourinary: Negative.    Musculoskeletal: Negative.    Skin: Negative.    Allergic/Immunologic: Negative.    Neurological: Negative.    Hematological: Negative.    Psychiatric/Behavioral: Negative.     All other systems reviewed and are negative.        Patient Active Problem List    Diagnosis    Multiple thyroid nodules     Past Medical History:   Diagnosis Date    Lyme disease 06/2023     Past Surgical History:   Procedure Laterality Date    IR BIOPSY ABDOMEN  09/21/2023    US GUIDED THYROID BIOPSY  08/17/2023    WISDOM TOOTH EXTRACTION       Family History   Problem Relation Age of Onset    No Known Problems Mother     No Known Problems Father     No Known Problems Brother     Thyroid disease unspecified Maternal Aunt         maybe overactive    Celiac disease Maternal Aunt     Thyroid disease unspecified Maternal Uncle     Thyroid disease unspecified Maternal Grandmother         1/2 thyroid removed     Social History     Socioeconomic History    Marital status: Single     Spouse name: Not on file    Number of children: Not on file    Years of education: Not on file    Highest education level: Not on file   Occupational History    Not on file   Tobacco Use    Smoking status: Never    Smokeless tobacco: Never   Vaping Use    Vaping Use: Never used   Substance and Sexual Activity    Alcohol use: Not Currently    Drug use: Yes     Types: Marijuana     Comment: occasional    Sexual activity: Not on file   Other Topics Concern    Not on file   Social History Narrative    Not on file     Social Determinants of Health     Financial Resource Strain: Not on file   Food Insecurity: Not on file   Transportation Needs: Not on file   Physical Activity: Not on file   Stress: Not on file   Social Connections: Not on file   Intimate Partner Violence: Not on file   Housing Stability: Not on file       Current Outpatient Medications:     levothyroxine (Synthroid) 125 mcg tablet, Take 1 tablet (125 mcg total) by mouth daily, Disp: 30 tablet, Rfl: 3  No Known Allergies  Vitals:    12/04/23 0907   BP: 138/74   Pulse: 69   Temp: 99.2 °F (37.3 °C)   SpO2: 100%       Physical Exam  Vitals reviewed.   Constitutional:       Appearance: Normal appearance.   HENT:      Head: Normocephalic and atraumatic.      Right  Ear: External ear normal.      Left Ear: External ear normal.   Eyes:      Extraocular Movements: Extraocular movements intact.      Pupils: Pupils are equal, round, and reactive to light.   Neck:      Comments: Visible and palpable bilateral thyroid nodules, left greater than right  Cardiovascular:      Rate and Rhythm: Normal rate and regular rhythm.      Pulses: Normal pulses.      Heart sounds: Normal heart sounds.   Pulmonary:      Breath sounds: Normal breath sounds.   Abdominal:      General: Abdomen is flat.      Palpations: Abdomen is soft.   Musculoskeletal:         General: Normal range of motion.      Cervical back: Normal range of motion and neck supple. No rigidity or tenderness.   Lymphadenopathy:      Cervical: No cervical adenopathy.   Skin:     General: Skin is warm and dry.   Neurological:      General: No focal deficit present.      Mental Status: He is alert and oriented to person, place, and time.   Psychiatric:         Mood and Affect: Mood normal.         Behavior: Behavior normal.         Thought Content: Thought content normal.         Judgment: Judgment normal.         Pathology:  Case Report   Non-gynecologic Cytology                          Case: [id number]                                   Authorizing Provider:  FEDERICO Arce         Collected:           09/21/2023 1049               Ordering Location:     [facility]     Received:            09/21/2023 [address] [place] [address] Interventional                                                                                Radiology                                                                     Pathologist:           Panfilo Coker MD                                                       Specimens:   A) - Thyroid, Right, lower                                                                          B) - Thyroid, Right, lower                                                                           C) - Thyroid, Left, lower                                                                            D) - Thyroid, Left, lower                                                                  Final Diagnosis   A.B. Thyroid, Right, lower ( ThinPrep and smear preparations ):  Atypia of undetermined significance ([place] Category III) - See note.  Atypical follicular cells with nuclear crowding , clearing, grooves and some overlapping.    Some clusters are forming microfollicles.    Thick Colloid is present.        Satisfactory for evaluation.     Note:  (1) As reported in the [place] System for Reporting Thyroid Cytopathology*, this diagnostic category has demonstrated anywhere from 10-30% risk of malignancy being found in subsequent resections and/or FNA.  This risk of malignancy is expected to change due to the usage of the surgical pathology diagnosis of “non-invasive follicular thyroid neoplasm with papillary-like nuclear features (NIFTP).”  The anticipated risk of malignancy secondary to NIFTP is 6-18%.    The manual reports that the usual management following this diagnosis is repeat FNA, molecular testing, or lobectomy. Ultimately, clinical/imaging correlation for this patient is needed in arriving at the actual management plan.     *The [place] System for Reporting Thyroid Cytopathology, Arian Hammond., Samson Parikh (Eds.), 2018 (2nd ed.)              C. 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  Oncocytic cells showing mild nuclear atypia   The Oncocytic cells are arranged in both crowded and loosely cohesive groups.    Colloid is present.     Satisfactory for evaluation.        Note: As reported in the [place] System for Reporting Thyroid Cytopathology* this diagnostic category has demonstrated anywhere from 10-40% risk of malignancy being  "found in subsequent resections and/or FNA.   The histologic follow-up of cases diagnosed as oncocytic neoplasm/suspicious for oncocytic neoplasm includes adenomatous nodules with oncocytic cell hyperplasia, Hurthle cell adenoma, Hurthle cell carcinoma, and Hurthle cell variant of papillary thyroid carcinoma, among others. The manual reports that the usual management following this diagnosis is genetic testing or lobectomy. Ultimately, clinical/imaging correlation for this patient is needed in arriving at the actual management plan.     *The [place] System for Reporting Thyroid Cytopathology, Arian Hammond, Samson Parikh (Eds.), 2018 (2nd ed.)                  Electronically signed by Panfilo Coker MD on 9/25/2023 at 10:42 AM     Both right and left nodules are Afirma suspicious.    Labs:  No results found for: \"[id number]\", \"TSH\", \"Y8SVVLV\", \"C0KKNCO\", \"THYROIDAB\"        Imaging    THYROID ULTRASOUND     INDICATION:    E04.9: Nontoxic goiter, unspecified.     COMPARISON:  None     TECHNIQUE:   Ultrasound of the thyroid was performed with a high frequency linear transducer in transverse and sagittal planes including volumetric imaging sweeps as well as traditional still imaging technique.     FINDINGS:  Normal homogeneous smooth echotexture.     Right lobe: 6.0 x 1.8 x 1.7 cm. Volume 8.7 mL  Left lobe:  5.7 x 2.6 x 3.1 cm. Volume 21.8 mL  Isthmus: 0.3  cm.     Nodule #1.  Image 7.  RIGHT midgland nodule measuring 0.6 x 0.7 x 0.7 cm.  COMPOSITION:  2 points, solid or almost completely solid .  ECHOGENICITY:  2 points, hypoechoic.  SHAPE:  3 points, taller-than-wide.  MARGIN: 0 points, smooth.  ECHOGENIC FOCI:  3 points, punctate echogenic foci.  TI-RADS Classification: TR 5 (7 or > points), Highly suspicious.  FNA if > 1 cm.  Follow if > 0.5 cm.     Nodule #2.  Image 14.  RIGHT lower pole nodule measuring 1.8 x 1.4 x 1.2 cm.  COMPOSITION:  2 points, solid or almost completely solid .  ECHOGENICITY:  1 " point, hyperechoic or isoechoic.  SHAPE:  0 points, wider-than-tall.  MARGIN: 0 points, smooth.  ECHOGENIC FOCI:  0 points, none or large comet-tail artifacts.  TI-RADS Classification: TR 3 (3 points), FNA if >2.5 cm.  Follow if >1.5 cm.     Nodule #3.  Image 40.  Nodule measuring 3.2 x 2.6 x 2.7 cm.  COMPOSITION: 2 points, solid or almost completely solid.  ECHOGENICITY:  1 point, hyperechoic or isoechoic.  SHAPE: 0 points, wider-than-tall.  MARGIN: 0 points, smooth.  ECHOGENIC FOCI:  0 points, none or large comet-tail artifacts.  TI-RADS Classification: TR 3 (3 points), FNA if >2.5 cm.  Follow if >1.5 cm.        IMPRESSION:  Left lower pole thyroid nodule meets current ACR criteria for recommending ultrasound-guided biopsy.     The 2 nodules in the right lobe warrant 1 year follow-up exam. However, given the suspicious features of the right mid gland nodule, consideration for biopsy of this nodule as well would be reasonable.           Reference: ACR Thyroid Imaging, Reporting and Data System (TI-RADS): White Paper of the ACR TI-RADS Committee. J AM Wai Radiol 2017;14:587-595. (additional recommendations based on American Thyroid Association 2015 guidelines.)     The study was marked in EPIC for significant notification.     Workstation performed: [id number]  No results found.  I reviewed the above laboratory and imaging data.    Discussion/Summary: 33-year-old man, bilateral thyroid nodules, suspicious on biopsy.  Plan for total thyroidectomy.  Rationale for this and low risk and benefits surgically infection, bleeding, nerve injury, hypocalcemia, discussed.  All questions answered and consent signed at the visit.

## 2023-12-04 NOTE — LETTER
December 4, 2023     Javier Palomo [address] [place] [id number]    Patient: Benito Burnette   YOB: 1990   Date of Visit: 12/4/2023       Dear Dr. Anthony David: Thank you for referring Benito Burnette to me for evaluation. Below are my notes for this consultation. If you have questions, please do not hesitate to call me. I look forward to following your patient along with you. Sincerely,        Maureen Weaver MD        CC: No Recipients    Maureen Weaver MD  12/4/2023 10:00 AM  Sign when Signing Visit               Surgical Oncology Consult       [address] [facility] [place]  [address] [facility] [address] [phone]    Benito Burnette  1990  [id number]  [address] [address] SURGICAL [facility]COLOGY Kasia Loser  [address] [facility] [address] [phone]    Chief Complaint   Patient presents with   • Consult       Assessment/Plan:    No problem-specific Assessment & Plan notes found for this encounter. Diagnoses and all orders for this visit:    Multiple thyroid nodules  -     Ambulatory referral to Surgical Oncology  -     US head neck lymph node mapping; Future  -     levothyroxine (Synthroid) 125 mcg tablet; Take 1 tablet (125 mcg total) by mouth daily    Abnormal thyroid biopsy  -     Ambulatory referral to Surgical Oncology      Advance Care Planning/Advance Directives:  Discussed disease status, cancer treatment plans and/or cancer treatment goals with the patient. Oncology History    No history exists. History of Present Illness: Patient is a 24-year-old man who of has noticed progressive enlarging thyroid nodule. This led to work-up including ultrasound confirming bilateral nodules, 2 of which met criteria for biopsy, with 1 on each side. No prior history of radiation exposure to head or neck area.   No personal or family history of any malignancies. Review of Systems   Constitutional: Negative. HENT: Negative. Negative for trouble swallowing and voice change. Eyes: Negative. Respiratory: Negative. Cardiovascular: Negative. Gastrointestinal: Negative. Endocrine: Negative. Genitourinary: Negative. Musculoskeletal: Negative. Skin: Negative. Allergic/Immunologic: Negative. Neurological: Negative. Hematological: Negative. Psychiatric/Behavioral: Negative. All other systems reviewed and are negative.         Patient Active Problem List   Diagnosis   • Multiple thyroid nodules     Past Medical History:   Diagnosis Date   • Lyme disease 06/2023     Past Surgical History:   Procedure Laterality Date   • IR BIOPSY ABDOMEN  09/21/2023   • US GUIDED THYROID BIOPSY  08/17/2023   • WISDOM TOOTH EXTRACTION       Family History   Problem Relation Age of Onset   • No Known Problems Mother    • No Known Problems Father    • No Known Problems Brother    • Thyroid disease unspecified Maternal Aunt         maybe overactive   • Celiac disease Maternal Aunt    • Thyroid disease unspecified Maternal Uncle    • Thyroid disease unspecified Maternal Grandmother         1/2 thyroid removed     Social History     Socioeconomic History   • Marital status: Single     Spouse name: Not on file   • Number of children: Not on file   • Years of education: Not on file   • Highest education level: Not on file   Occupational History   • Not on file   Tobacco Use   • Smoking status: Never   • Smokeless tobacco: Never   Vaping Use   • Vaping Use: Never used   Substance and Sexual Activity   • Alcohol use: Not Currently   • Drug use: Yes     Types: Marijuana     Comment: occasional   • Sexual activity: Not on file   Other Topics Concern   • Not on file   Social History Narrative   • Not on file     Social Determinants of Health     Financial Resource Strain: Not on file   Food Insecurity: Not on file   Transportation Needs: Not on file Physical Activity: Not on file   Stress: Not on file   Social Connections: Not on file   Intimate Partner Violence: Not on file   Housing Stability: Not on file       Current Outpatient Medications:   •  levothyroxine (Synthroid) 125 mcg tablet, Take 1 tablet (125 mcg total) by mouth daily, Disp: 30 tablet, Rfl: 3  No Known Allergies  Vitals:    12/04/23 0907   BP: 138/74   Pulse: 69   Temp: 99.2 °F (37.3 °C)   SpO2: 100%       Physical Exam  Vitals reviewed. Constitutional:       Appearance: Normal appearance. HENT:      Head: Normocephalic and atraumatic. Right Ear: External ear normal.      Left Ear: External ear normal.   Eyes:      Extraocular Movements: Extraocular movements intact. Pupils: Pupils are equal, round, and reactive to light. Neck:      Comments: Visible and palpable bilateral thyroid nodules, left greater than right  Cardiovascular:      Rate and Rhythm: Normal rate and regular rhythm. Pulses: Normal pulses. Heart sounds: Normal heart sounds. Pulmonary:      Breath sounds: Normal breath sounds. Abdominal:      General: Abdomen is flat. Palpations: Abdomen is soft. Musculoskeletal:         General: Normal range of motion. Cervical back: Normal range of motion and neck supple. No rigidity or tenderness. Lymphadenopathy:      Cervical: No cervical adenopathy. Skin:     General: Skin is warm and dry. Neurological:      General: No focal deficit present. Mental Status: He is alert and oriented to person, place, and time. Psychiatric:         Mood and Affect: Mood normal.         Behavior: Behavior normal.         Thought Content:  Thought content normal.         Judgment: Judgment normal.         Pathology:  Case Report   Non-gynecologic Cytology                          Case: [id number]                                   Authorizing Provider:  FEDERICO Jones         Collected:           09/21/2023 1049               Ordering Location:     [facility]     Received:            09/21/2023 [address] [facility] [facility]                                                                                Radiology                                                                     Pathologist:           Yuly Barlow MD                                                       Specimens:   A) - Thyroid, Right, lower                                                                          B) - Thyroid, Right, lower                                                                          C) - Thyroid, Left, lower                                                                            D) - Thyroid, Left, lower                                                                  Final Diagnosis   A.B. Thyroid, Right, lower ( ThinPrep and smear preparations ):  Atypia of undetermined significance ([place] Category III) - See note. Atypical follicular cells with nuclear crowding , clearing, grooves and some overlapping. Some clusters are forming microfollicles. Thick Colloid is present. Satisfactory for evaluation. Note:  (1) As reported in the [facility] for Reporting Thyroid Cytopathology*, this diagnostic category has demonstrated anywhere from 10-30% risk of malignancy being found in subsequent resections and/or FNA. This risk of malignancy is expected to change due to the usage of the surgical pathology diagnosis of “non-invasive follicular thyroid neoplasm with papillary-like nuclear features (NIFTP). ”  The anticipated risk of malignancy secondary to NIFTP is 6-18%. The manual reports that the usual management following this diagnosis is repeat FNA, molecular testing, or lobectomy. Ultimately, clinical/imaging correlation for this patient is needed in arriving at the actual management plan.      *The [place] System for Reporting Thyroid Cytopathology, Yina Pugh, [place].), 2018 (2nd ed.)              C. D.Thyroid, Left, lower ( ThinPrep and smear preparations ):  Follicular neoplasm (oncocytic type)/Suspicious for follicular neoplasm (oncocytic type) ([place] Category IV, oncocytic type) - See note. Oncocytic cells showing mild nuclear atypia   The Oncocytic cells are arranged in both crowded and loosely cohesive groups. Colloid is present. Satisfactory for evaluation. Note: As reported in the [facility] for Reporting Thyroid Cytopathology* this diagnostic category has demonstrated anywhere from 10-40% risk of malignancy being found in subsequent resections and/or FNA. The histologic follow-up of cases diagnosed as oncocytic neoplasm/suspicious for oncocytic neoplasm includes adenomatous nodules with oncocytic cell hyperplasia, Hurthle cell adenoma, Hurthle cell carcinoma, and Hurthle cell variant of papillary thyroid carcinoma, among others. The manual reports that the usual management following this diagnosis is genetic testing or lobectomy. Ultimately, clinical/imaging correlation for this patient is needed in arriving at the actual management plan. *The [place] System for Reporting Thyroid Cytopathology, Yelitza Cano., Tamera Magdaleno.), 2018 (2nd ed.)                  Electronically signed by Dexter Leo MD on 9/25/2023 at 10:42 AM     Both right and left nodules are Afirma suspicious. Labs:  No results found for: "YSZ4CICJXQUF", "TSH", "B2IHTIA", "V9ZGDOT", "THYROIDAB"        Imaging    THYROID ULTRASOUND     INDICATION:    E04.9: Nontoxic goiter, unspecified. COMPARISON:  None     TECHNIQUE:   Ultrasound of the thyroid was performed with a high frequency linear transducer in transverse and sagittal planes including volumetric imaging sweeps as well as traditional still imaging technique. FINDINGS:  Normal homogeneous smooth echotexture. Right lobe: 6.0 x 1.8 x 1.7 cm. Volume 8.7 mL  Left lobe:  5.7 x 2.6 x 3.1 cm.  Volume 21.8 mL  Isthmus: 0.3  cm. Nodule #1. Image 7. RIGHT midgland nodule measuring 0.6 x 0.7 x 0.7 cm. COMPOSITION:  2 points, solid or almost completely solid . ECHOGENICITY:  2 points, hypoechoic. SHAPE:  3 points, taller-than-wide. MARGIN: 0 points, smooth. ECHOGENIC FOCI:  3 points, punctate echogenic foci. TI-RADS Classification: TR 5 (7 or > points), Highly suspicious. FNA if > 1 cm. Follow if > 0.5 cm. Nodule #2. Image 14. RIGHT lower pole nodule measuring 1.8 x 1.4 x 1.2 cm. COMPOSITION:  2 points, solid or almost completely solid . ECHOGENICITY:  1 point, hyperechoic or isoechoic. SHAPE:  0 points, wider-than-tall. MARGIN: 0 points, smooth. ECHOGENIC FOCI:  0 points, none or large comet-tail artifacts. TI-RADS Classification: TR 3 (3 points), FNA if >2.5 cm. Follow if >1.5 cm. Nodule #3. Image 40. Nodule measuring 3.2 x 2.6 x 2.7 cm. COMPOSITION: 2 points, solid or almost completely solid. ECHOGENICITY:  1 point, hyperechoic or isoechoic. SHAPE: 0 points, wider-than-tall. MARGIN: 0 points, smooth. ECHOGENIC FOCI:  0 points, none or large comet-tail artifacts. TI-RADS Classification: TR 3 (3 points), FNA if >2.5 cm. Follow if >1.5 cm. IMPRESSION:  Left lower pole thyroid nodule meets current ACR criteria for recommending ultrasound-guided biopsy. The 2 nodules in the right lobe warrant 1 year follow-up exam. However, given the suspicious features of the right mid gland nodule, consideration for biopsy of this nodule as well would be reasonable. Reference: ACR Thyroid Imaging, Reporting and Data System (TI-RADS): White Paper of the [employer]. J AM Wai Radiol 3572;51:146-942. (additional recommendations based on American Thyroid Association 2015 guidelines.)     The study was marked in EPIC for significant notification. Workstation performed: [id number]  No results found.   I reviewed the above laboratory and imaging data.    Discussion/Summary: 68-year-old man, bilateral thyroid nodules, suspicious on biopsy. Plan for total thyroidectomy. Rationale for this and low risk and benefits surgically infection, bleeding, nerve injury, hypocalcemia, discussed. All questions answered and consent signed at the visit.

## 2023-12-10 ENCOUNTER — HOSPITAL ENCOUNTER (OUTPATIENT)
Dept: ULTRASOUND IMAGING | Facility: HOSPITAL | Age: 33
Discharge: HOME | End: 2023-12-10
Attending: SURGERY
Payer: COMMERCIAL

## 2023-12-10 DIAGNOSIS — E04.2 MULTIPLE THYROID NODULES: ICD-10-CM

## 2023-12-10 PROCEDURE — 76536 US EXAM OF HEAD AND NECK: CPT

## 2023-12-13 ENCOUNTER — HOSPITAL ENCOUNTER (OUTPATIENT)
Dept: RADIOLOGY | Facility: HOSPITAL | Age: 33
Discharge: HOME | End: 2023-12-13
Payer: COMMERCIAL

## 2023-12-13 ENCOUNTER — APPOINTMENT (OUTPATIENT)
Dept: LAB | Facility: HOSPITAL | Age: 33
End: 2023-12-13
Payer: COMMERCIAL

## 2023-12-13 DIAGNOSIS — E04.2 NONTOXIC MULTINODULAR GOITER: ICD-10-CM

## 2023-12-13 DIAGNOSIS — E04.2 MULTIPLE THYROID NODULES: ICD-10-CM

## 2023-12-13 LAB
ALBUMIN SERPL BCP-MCNC: 4.8 G/DL (ref 3.5–5)
ALP SERPL-CCNC: 40 U/L (ref 34–104)
ALT SERPL W P-5'-P-CCNC: 14 U/L (ref 7–52)
ANION GAP SERPL CALCULATED.3IONS-SCNC: 6 MMOL/L
AST SERPL W P-5'-P-CCNC: 17 U/L (ref 13–39)
ATRIAL RATE: 52 BPM
BASOPHILS # BLD AUTO: 0.05 THOUSANDS/ÂΜL (ref 0–0.1)
BASOPHILS NFR BLD AUTO: 1 % (ref 0–1)
BILIRUB SERPL-MCNC: 0.35 MG/DL (ref 0.2–1)
BUN SERPL-MCNC: 26 MG/DL (ref 5–25)
CALCIUM SERPL-MCNC: 9.6 MG/DL (ref 8.4–10.2)
CHLORIDE SERPL-SCNC: 105 MMOL/L (ref 96–108)
CO2 SERPL-SCNC: 29 MMOL/L (ref 21–32)
CREAT SERPL-MCNC: 0.99 MG/DL (ref 0.6–1.3)
EOSINOPHIL # BLD AUTO: 0.17 THOUSAND/ÂΜL (ref 0–0.61)
EOSINOPHIL NFR BLD AUTO: 3 % (ref 0–6)
ERYTHROCYTE [DISTWIDTH] IN BLOOD BY AUTOMATED COUNT: 12.9 % (ref 11.6–15.1)
GFR SERPL CREATININE-BSD FRML MDRD: 101 ML/MIN/1.73SQ M
GLUCOSE P FAST SERPL-MCNC: 101 MG/DL (ref 65–99)
HCT VFR BLD AUTO: 45 % (ref 36.5–49.3)
HGB BLD-MCNC: 14.3 G/DL (ref 12–17)
IMM GRANULOCYTES # BLD AUTO: 0.02 THOUSAND/UL (ref 0–0.2)
IMM GRANULOCYTES NFR BLD AUTO: 0 % (ref 0–2)
LYMPHOCYTES # BLD AUTO: 1.8 THOUSANDS/ÂΜL (ref 0.6–4.47)
LYMPHOCYTES NFR BLD AUTO: 33 % (ref 14–44)
MCH RBC QN AUTO: 27.8 PG (ref 26.8–34.3)
MCHC RBC AUTO-ENTMCNC: 31.8 G/DL (ref 31.4–37.4)
MCV RBC AUTO: 88 FL (ref 82–98)
MONOCYTES # BLD AUTO: 0.45 THOUSAND/ÂΜL (ref 0.17–1.22)
MONOCYTES NFR BLD AUTO: 8 % (ref 4–12)
NEUTROPHILS # BLD AUTO: 3.05 THOUSANDS/ÂΜL (ref 1.85–7.62)
NEUTS SEG NFR BLD AUTO: 55 % (ref 43–75)
NRBC BLD AUTO-RTO: 0 /100 WBCS
P AXIS: 59 DEGREES
PLATELET # BLD AUTO: 197 THOUSANDS/UL (ref 149–390)
PMV BLD AUTO: 10.7 FL (ref 8.9–12.7)
POTASSIUM SERPL-SCNC: 4 MMOL/L (ref 3.5–5.3)
PR INTERVAL: 138 MS
PROT SERPL-MCNC: 7.9 G/DL (ref 6.4–8.4)
QRS AXIS: 83 DEGREES
QRSD INTERVAL: 94 MS
QT INTERVAL: 404 MS
QTC INTERVAL: 375 MS
RBC # BLD AUTO: 5.14 MILLION/UL (ref 3.88–5.62)
SODIUM SERPL-SCNC: 140 MMOL/L (ref 135–147)
T WAVE AXIS: 67 DEGREES
VENTRICULAR RATE: 52 BPM
WBC # BLD AUTO: 5.54 THOUSAND/UL (ref 4.31–10.16)

## 2023-12-13 PROCEDURE — 93005 ELECTROCARDIOGRAM TRACING: CPT

## 2023-12-13 PROCEDURE — 80053 COMPREHEN METABOLIC PANEL: CPT

## 2023-12-13 PROCEDURE — 71046 X-RAY EXAM CHEST 2 VIEWS: CPT

## 2023-12-13 PROCEDURE — 85025 COMPLETE CBC W/AUTO DIFF WBC: CPT

## 2023-12-13 PROCEDURE — 36415 COLL VENOUS BLD VENIPUNCTURE: CPT

## 2023-12-18 NOTE — PRE-PROCEDURE INSTRUCTIONS
Pre-Surgery Instructions:   Medication Instructions    levothyroxine (Synthroid) 125 mcg tablet Take day of surgery.      Spoke with pt via phone.    Medication instructions for day surgery reviewed. Please use only a sip of water to take your instructed medications. Avoid all over the counter vitamins, supplements and NSAIDS for one week prior to surgery per anesthesia guidelines. Tylenol is ok to take as needed.     You will receive a call one business day prior to surgery with an arrival time and hospital directions. If your surgery is scheduled on a Monday, the hospital will be calling you on the Friday prior to your surgery. If you have not heard from anyone by 8pm, please call the hospital supervisor through the hospital  at [phone]. (Naresh [phone]).    Do not eat or drink anything after midnight the night before your surgery, including candy, mints, lifesavers, or chewing gum. Do not drink alcohol 24hrs before your surgery. Try not to smoke at least 24hrs before your surgery.       Follow the pre surgery showering instructions as listed in the “My Surgical Experience Booklet” or otherwise provided by your surgeon's office. Do not use a blade to shave the surgical area 1 week before surgery. It is okay to use a clean electric clippers up to 24 hours before surgery. Do not apply any lotions, creams, including makeup, cologne, deodorant, or perfumes after showering on the day of your surgery. Do not use dry shampoo, hair spray, hair gel, or any type of hair products.     No contact lenses, eye make-up, or artificial eyelashes. Remove nail polish, including gel polish, and any artificial, gel, or acrylic nails if possible. Remove all jewelry including rings and body piercing jewelry.     Wear causal clothing that is easy to take on and off. Consider your type of surgery.    Keep any valuables, jewelry, piercings at home. Please bring any specially ordered equipment (sling, braces) if  indicated.    Arrange for a responsible person to drive you to and from the hospital on the day of your surgery. Visitor Guidelines discussed.     Call the surgeon's office with any new illnesses, exposures, or additional questions prior to surgery.    Please reference your “My Surgical Experience Booklet” for additional information to prepare for your upcoming surgery.

## 2023-12-22 ENCOUNTER — ANESTHESIA EVENT (OUTPATIENT)
Dept: PERIOP | Facility: HOSPITAL | Age: 33
End: 2023-12-22
Payer: COMMERCIAL

## 2023-12-22 ENCOUNTER — HOSPITAL ENCOUNTER (OUTPATIENT)
Facility: HOSPITAL | Age: 33
Setting detail: OUTPATIENT SURGERY
Discharge: HOME | End: 2023-12-22
Attending: SURGERY | Admitting: SURGERY
Payer: COMMERCIAL

## 2023-12-22 ENCOUNTER — ANESTHESIA (OUTPATIENT)
Dept: PERIOP | Facility: HOSPITAL | Age: 33
End: 2023-12-22
Payer: COMMERCIAL

## 2023-12-22 VITALS
HEIGHT: 73 IN | TEMPERATURE: 98.1 F | DIASTOLIC BLOOD PRESSURE: 73 MMHG | SYSTOLIC BLOOD PRESSURE: 123 MMHG | OXYGEN SATURATION: 99 % | WEIGHT: 160 LBS | BODY MASS INDEX: 21.2 KG/M2 | RESPIRATION RATE: 18 BRPM | HEART RATE: 70 BPM

## 2023-12-22 DIAGNOSIS — R89.9 ABNORMAL THYROID BIOPSY: ICD-10-CM

## 2023-12-22 DIAGNOSIS — E04.2 MULTIPLE THYROID NODULES: ICD-10-CM

## 2023-12-22 LAB
CA-I BLD-SCNC: 1.18 MMOL/L (ref 1.12–1.32)
CALCIUM SERPL-MCNC: 9.3 MG/DL (ref 8.4–10.2)
PTH-INTACT P EXCISION SERPL-MCNC: 27.9 PG/ML (ref 12–88)

## 2023-12-22 PROCEDURE — 82310 ASSAY OF CALCIUM: CPT | Performed by: STUDENT IN AN ORGANIZED HEALTH CARE EDUCATION/TRAINING PROGRAM

## 2023-12-22 PROCEDURE — 82330 ASSAY OF CALCIUM: CPT | Performed by: STUDENT IN AN ORGANIZED HEALTH CARE EDUCATION/TRAINING PROGRAM

## 2023-12-22 PROCEDURE — 60240 REMOVAL OF THYROID: CPT | Performed by: SURGERY

## 2023-12-22 PROCEDURE — 83970 ASSAY OF PARATHORMONE: CPT | Performed by: STUDENT IN AN ORGANIZED HEALTH CARE EDUCATION/TRAINING PROGRAM

## 2023-12-22 PROCEDURE — 88307 TISSUE EXAM BY PATHOLOGIST: CPT | Performed by: STUDENT IN AN ORGANIZED HEALTH CARE EDUCATION/TRAINING PROGRAM

## 2023-12-22 RX ORDER — DEXAMETHASONE SODIUM PHOSPHATE 10 MG/ML
INJECTION, SOLUTION INTRAMUSCULAR; INTRAVENOUS AS NEEDED
Status: DISCONTINUED | OUTPATIENT
Start: 2023-12-22 | End: 2023-12-22

## 2023-12-22 RX ORDER — PROMETHAZINE HYDROCHLORIDE 25 MG/ML
12.5 INJECTION, SOLUTION INTRAMUSCULAR; INTRAVENOUS ONCE
Status: COMPLETED | OUTPATIENT
Start: 2023-12-22 | End: 2023-12-22

## 2023-12-22 RX ORDER — ONDANSETRON 2 MG/ML
4 INJECTION INTRAMUSCULAR; INTRAVENOUS ONCE AS NEEDED
Status: DISCONTINUED | OUTPATIENT
Start: 2023-12-22 | End: 2023-12-22 | Stop reason: HOSPADM

## 2023-12-22 RX ORDER — MIDAZOLAM HYDROCHLORIDE 2 MG/2ML
INJECTION, SOLUTION INTRAMUSCULAR; INTRAVENOUS AS NEEDED
Status: DISCONTINUED | OUTPATIENT
Start: 2023-12-22 | End: 2023-12-22

## 2023-12-22 RX ORDER — BUPIVACAINE HYDROCHLORIDE 2.5 MG/ML
INJECTION, SOLUTION EPIDURAL; INFILTRATION; INTRACAUDAL AS NEEDED
Status: DISCONTINUED | OUTPATIENT
Start: 2023-12-22 | End: 2023-12-22 | Stop reason: HOSPADM

## 2023-12-22 RX ORDER — MAGNESIUM HYDROXIDE 1200 MG/15ML
LIQUID ORAL AS NEEDED
Status: DISCONTINUED | OUTPATIENT
Start: 2023-12-22 | End: 2023-12-22 | Stop reason: HOSPADM

## 2023-12-22 RX ORDER — FENTANYL CITRATE 50 UG/ML
INJECTION, SOLUTION INTRAMUSCULAR; INTRAVENOUS AS NEEDED
Status: DISCONTINUED | OUTPATIENT
Start: 2023-12-22 | End: 2023-12-22

## 2023-12-22 RX ORDER — ONDANSETRON 2 MG/ML
INJECTION INTRAMUSCULAR; INTRAVENOUS AS NEEDED
Status: DISCONTINUED | OUTPATIENT
Start: 2023-12-22 | End: 2023-12-22

## 2023-12-22 RX ORDER — LIDOCAINE HYDROCHLORIDE 10 MG/ML
INJECTION, SOLUTION EPIDURAL; INFILTRATION; INTRACAUDAL; PERINEURAL AS NEEDED
Status: DISCONTINUED | OUTPATIENT
Start: 2023-12-22 | End: 2023-12-22

## 2023-12-22 RX ORDER — GLYCOPYRROLATE 0.2 MG/ML
INJECTION INTRAMUSCULAR; INTRAVENOUS AS NEEDED
Status: DISCONTINUED | OUTPATIENT
Start: 2023-12-22 | End: 2023-12-22

## 2023-12-22 RX ORDER — HYDROMORPHONE HYDROCHLORIDE 1 MG/ML
INJECTION, SOLUTION INTRAMUSCULAR; INTRAVENOUS; SUBCUTANEOUS AS NEEDED
Status: DISCONTINUED | OUTPATIENT
Start: 2023-12-22 | End: 2023-12-22

## 2023-12-22 RX ORDER — HYDROMORPHONE HCL IN WATER/PF 6 MG/30 ML
0.2 PATIENT CONTROLLED ANALGESIA SYRINGE INTRAVENOUS
Status: DISCONTINUED | OUTPATIENT
Start: 2023-12-22 | End: 2023-12-22 | Stop reason: HOSPADM

## 2023-12-22 RX ORDER — FENTANYL CITRATE/PF 50 MCG/ML
50 SYRINGE (ML) INJECTION
Status: COMPLETED | OUTPATIENT
Start: 2023-12-22 | End: 2023-12-22

## 2023-12-22 RX ORDER — SUCCINYLCHOLINE/SOD CL,ISO/PF 100 MG/5ML
SYRINGE (ML) INTRAVENOUS AS NEEDED
Status: DISCONTINUED | OUTPATIENT
Start: 2023-12-22 | End: 2023-12-22

## 2023-12-22 RX ORDER — OXYCODONE HYDROCHLORIDE AND ACETAMINOPHEN 5; 325 MG/1; MG/1
1 TABLET ORAL EVERY 4 HOURS PRN
Status: DISCONTINUED | OUTPATIENT
Start: 2023-12-22 | End: 2023-12-22

## 2023-12-22 RX ORDER — EPHEDRINE SULFATE 50 MG/ML
INJECTION INTRAVENOUS AS NEEDED
Status: DISCONTINUED | OUTPATIENT
Start: 2023-12-22 | End: 2023-12-22

## 2023-12-22 RX ORDER — ROCURONIUM BROMIDE 10 MG/ML
INJECTION, SOLUTION INTRAVENOUS AS NEEDED
Status: DISCONTINUED | OUTPATIENT
Start: 2023-12-22 | End: 2023-12-22

## 2023-12-22 RX ORDER — OXYCODONE HYDROCHLORIDE 5 MG/1
5 TABLET ORAL EVERY 4 HOURS PRN
Status: DISCONTINUED | OUTPATIENT
Start: 2023-12-22 | End: 2023-12-22 | Stop reason: HOSPADM

## 2023-12-22 RX ORDER — ACETAMINOPHEN 325 MG/1
975 TABLET ORAL ONCE
Status: COMPLETED | OUTPATIENT
Start: 2023-12-22 | End: 2023-12-22

## 2023-12-22 RX ORDER — PROPOFOL 10 MG/ML
INJECTION, EMULSION INTRAVENOUS AS NEEDED
Status: DISCONTINUED | OUTPATIENT
Start: 2023-12-22 | End: 2023-12-22

## 2023-12-22 RX ORDER — OXYCODONE HYDROCHLORIDE 5 MG/1
5 TABLET ORAL EVERY 4 HOURS PRN
Status: DISCONTINUED | OUTPATIENT
Start: 2023-12-22 | End: 2023-12-22

## 2023-12-22 RX ORDER — SODIUM CHLORIDE, SODIUM LACTATE, POTASSIUM CHLORIDE, CALCIUM CHLORIDE 600; 310; 30; 20 MG/100ML; MG/100ML; MG/100ML; MG/100ML
INJECTION, SOLUTION INTRAVENOUS CONTINUOUS PRN
Status: DISCONTINUED | OUTPATIENT
Start: 2023-12-22 | End: 2023-12-22

## 2023-12-22 RX ORDER — ONDANSETRON 2 MG/ML
4 INJECTION INTRAMUSCULAR; INTRAVENOUS ONCE
Status: COMPLETED | OUTPATIENT
Start: 2023-12-22 | End: 2023-12-22

## 2023-12-22 RX ORDER — ONDANSETRON 2 MG/ML
4 INJECTION INTRAMUSCULAR; INTRAVENOUS ONCE
Status: DISCONTINUED | OUTPATIENT
Start: 2023-12-22 | End: 2023-12-22

## 2023-12-22 RX ADMIN — ROCURONIUM BROMIDE 25 MG: 10 INJECTION, SOLUTION INTRAVENOUS at 08:13

## 2023-12-22 RX ADMIN — LIDOCAINE HYDROCHLORIDE 5 ML: 10 INJECTION, SOLUTION EPIDURAL; INFILTRATION; INTRACAUDAL; PERINEURAL at 08:02

## 2023-12-22 RX ADMIN — MIDAZOLAM 2 MG: 1 INJECTION INTRAMUSCULAR; INTRAVENOUS at 07:56

## 2023-12-22 RX ADMIN — ROCURONIUM BROMIDE 5 MG: 10 INJECTION, SOLUTION INTRAVENOUS at 08:03

## 2023-12-22 RX ADMIN — DEXMEDETOMIDINE HYDROCHLORIDE 8 MCG: 100 INJECTION, SOLUTION INTRAVENOUS at 08:15

## 2023-12-22 RX ADMIN — PROPOFOL 50 MG: 10 INJECTION, EMULSION INTRAVENOUS at 09:10

## 2023-12-22 RX ADMIN — Medication 80 MG: at 08:04

## 2023-12-22 RX ADMIN — OXYCODONE HYDROCHLORIDE 5 MG: 5 TABLET ORAL at 11:39

## 2023-12-22 RX ADMIN — FENTANYL CITRATE 25 MCG: 50 INJECTION INTRAMUSCULAR; INTRAVENOUS at 07:57

## 2023-12-22 RX ADMIN — ROCURONIUM BROMIDE 10 MG: 10 INJECTION, SOLUTION INTRAVENOUS at 09:11

## 2023-12-22 RX ADMIN — GLYCOPYRROLATE 0.1 MG: 0.2 INJECTION INTRAMUSCULAR; INTRAVENOUS at 08:22

## 2023-12-22 RX ADMIN — ONDANSETRON 4 MG: 2 INJECTION INTRAMUSCULAR; INTRAVENOUS at 11:45

## 2023-12-22 RX ADMIN — ONDANSETRON 4 MG: 2 INJECTION INTRAMUSCULAR; INTRAVENOUS at 08:15

## 2023-12-22 RX ADMIN — EPHEDRINE SULFATE 10 MG: 50 INJECTION INTRAVENOUS at 08:30

## 2023-12-22 RX ADMIN — SUGAMMADEX 200 MG: 100 INJECTION, SOLUTION INTRAVENOUS at 09:55

## 2023-12-22 RX ADMIN — ACETAMINOPHEN 975 MG: 325 TABLET, FILM COATED ORAL at 11:20

## 2023-12-22 RX ADMIN — ROCURONIUM BROMIDE 10 MG: 10 INJECTION, SOLUTION INTRAVENOUS at 08:57

## 2023-12-22 RX ADMIN — SODIUM CHLORIDE, SODIUM LACTATE, POTASSIUM CHLORIDE, AND CALCIUM CHLORIDE: .6; .31; .03; .02 INJECTION, SOLUTION INTRAVENOUS at 08:00

## 2023-12-22 RX ADMIN — PROPOFOL 150 MG: 10 INJECTION, EMULSION INTRAVENOUS at 08:03

## 2023-12-22 RX ADMIN — DEXAMETHASONE SODIUM PHOSPHATE 10 MG: 10 INJECTION, SOLUTION INTRAMUSCULAR; INTRAVENOUS at 08:17

## 2023-12-22 RX ADMIN — HYDROMORPHONE HYDROCHLORIDE 0.5 MG: 1 INJECTION, SOLUTION INTRAMUSCULAR; INTRAVENOUS; SUBCUTANEOUS at 08:34

## 2023-12-22 RX ADMIN — FENTANYL CITRATE 75 MCG: 50 INJECTION INTRAMUSCULAR; INTRAVENOUS at 08:10

## 2023-12-22 RX ADMIN — EPHEDRINE SULFATE 10 MG: 50 INJECTION INTRAVENOUS at 08:24

## 2023-12-22 RX ADMIN — FENTANYL CITRATE 50 MCG: 50 INJECTION INTRAMUSCULAR; INTRAVENOUS at 10:41

## 2023-12-22 RX ADMIN — FENTANYL CITRATE 50 MCG: 50 INJECTION INTRAMUSCULAR; INTRAVENOUS at 10:21

## 2023-12-22 RX ADMIN — ROCURONIUM BROMIDE 10 MG: 10 INJECTION, SOLUTION INTRAVENOUS at 08:33

## 2023-12-22 RX ADMIN — PROMETHAZINE HYDROCHLORIDE 12.5 MG: 25 INJECTION INTRAMUSCULAR; INTRAVENOUS at 12:28

## 2023-12-22 NOTE — DISCHARGE INSTR - AVS FIRST PAGE
POST-OPERATIVE WOUND CARE INSTRUCTIONS     Your wound is closed with:              Dissolvable sutures/steri strips                    Wound care:              Let the steri strips fall off on their own. Do not submerge your incision in water for 2 weeks. Showering is OK after 24 hours.      Activity:              Did not perform any heavy lifting or strenuous physical activity for 7 days.              Your activity restrictions will be reevaluated at your postoperative visit.     Medications:              You may resume all your preoperative medications and diet.              Pain medication as directed on the prescription given in the office.     Other:              May use ice on the wound for 24-48 hours as needed for comfort.                        Call the office at [phone] if you have any of the followin. Redness, swelling, heat, unusual drainage or heavy bleeding from the wound.              2. Fever greater than 101°F.              3. Pain not relieved by the prescribed pain medication

## 2023-12-22 NOTE — ANESTHESIA PREPROCEDURE EVALUATION
Procedure:  TOTAL THYROIDECTOMY (Neck)    Relevant Problems   No relevant active problems        Physical Exam    Airway    Mallampati score: I  TM Distance: >3 FB  Neck ROM: full     Dental   No notable dental hx     Cardiovascular  Rhythm: regular, Rate: normal    Pulmonary   Breath sounds clear to auscultation    Other Findings        Anesthesia Plan  ASA Score- 2     Anesthesia Type- general with ASA Monitors.         Additional Monitors:     Airway Plan: ETT.           Plan Factors-Exercise tolerance (METS): >4 METS.    Chart reviewed. EKG reviewed.  Existing labs reviewed. Patient summary reviewed.    Patient is not a current smoker.              Induction- intravenous.    Postoperative Plan- Plan for postoperative opioid use.     Informed Consent- Anesthetic plan and risks discussed with patient.  I personally reviewed this patient with the CRNA. Discussed and agreed on the Anesthesia Plan with the CRNA..

## 2023-12-22 NOTE — OP NOTE
OPERATIVE REPORT  PATIENT NAME: Radhames Meade    :  1990  MRN: [id number]  Pt Location: AN OR ROOM 01    SURGERY DATE: 2023    Surgeons and Role:     * Adrien Dsouza MD - Primary     * Kellen Mckenzie DO - Assisting    Preop Diagnosis:  Multiple thyroid nodules [E04.2]  Abnormal thyroid biopsy [R89.9]    Post-Op Diagnosis Codes:     * Multiple thyroid nodules [E04.2]     * Abnormal thyroid biopsy [R89.9]    Procedure(s):  TOTAL THYROIDECTOMY    Specimen(s):  ID Type Source Tests Collected by Time Destination   1 : total thyroidectomy, stitch mary left lobe Tissue Thyroid TISSUE EXAM Adrien Dsouza MD 2023 0901        Estimated Blood Loss:   20 mL    Drains:  * No LDAs found *    Anesthesia Type:   General    Operative Indications:  Multiple thyroid nodules [E04.2]  Abnormal thyroid biopsy [R89.9]      Operative Findings:  Bilateral thyroid nodules, left greater than right in size    Complications:   None    Procedure and Technique:  The patient was brought into the operating room and identified by a proper timeout. Following this he was intubated by the anesthesia team. He was then positioned with a shoulder roll behind the scapula and the head in the sniffing position. The neck was then prepped and draped in the usual fashion. Following this, the skin at and around the planned cervical incision site was anesthetized with lidocaine. Next, a 4 cm incision was made 2 fingerbreadths above the sternal notch. Cautery was used to dissect through the dermis and subcutaneous fat. The platysma was transected with cautery. We then created flaps superiorly and inferiorly underneath the platysma. Gelpi retractors were then placed for exposure. We then proceeded to look for the strap muscles. We were able to visualize strap muscles and divided them to expose the thyroid.    We first focused on the left thyroid lobe. The strap muscles were mobilized them off the anterolateral aspect of the thyroid  lobe. Using traction on the superior pole we were able to take down the superior pole vessels with Harmonic Scalpel. We rotated the gland anteromedially. We saw what appeared to be the parathyroid glands which were visualized and preserved by means of close capsular dissection using bipolar device. We visualized recurrent laryngeal nerve as we rolled the small lobe forward. The ligament of berry was then clamped and tied off with a 2-0 vicryl suture. The lobe was then freed from anterior surface of the trachea with cautery. Valsalva was applied in the operative Field inspected for bleeding. None was seen. Surgicel was placed in the operative field.    We then focused on the right thyroid lobe. The strap muscles were mobilized them off the anterolateral aspect of the thyroid lobe. Using traction on the superior pole we were able to take down the superior pole vessels with Harmonic Scalpel. We rotated the gland anteromedially. We saw what appeared to be the parathyroid glands which were visualized and preserved by means of close capsular dissection using bipolar device. We visualized recurrent laryngeal nerve as we rolled the small lobe forward. The ligament of berry was then clamped and tied off with a 2-0 vicryl suture. The lobe was then freed from anterior surface of the trachea with cautery and the gland was then sent to pathology for processing. Valsalva was applied in the operative Field inspected for bleeding. None was seen. Surgicel was placed in the operative field.    Once we were sure of hemostasis, closure was performed using 3-0 Vicryl to close the strap muscles in the midline, followed by running 3-0 Vicryl to close the platysma, followed by a 4-0 vicryl to close the dermis in interrupted fashion, followed by 5-0 Monocryl placed in running subcuticular fashion to close the skin. Benzoin and steristrips were applied to the incision, followed by gauze and a blue towel. The patient tolerated the procedure  well without any difficulties.   I was present for the entire procedure.    Patient Disposition:  PACU         SIGNATURE: Adrien Dsouza MD  DATE: December 22, 2023  TIME: 10:02 AM

## 2023-12-22 NOTE — ANESTHESIA POSTPROCEDURE EVALUATION
Post-Op Assessment Note    CV Status:  Stable  Pain Score: 2    Pain management: adequate       Mental Status:  Sleepy   Hydration Status:  Stable   PONV Controlled:  None   Airway Patency:  Patent     Post Op Vitals Reviewed: Yes      Staff: CRNA               BP   128/74   Temp 98.1 °F (36.7 °C) (12/22/23 1012)    Pulse  61   Resp   15   SpO2   100

## 2023-12-29 ENCOUNTER — TELEPHONE (OUTPATIENT)
Dept: SURGICAL ONCOLOGY | Facility: CLINIC | Age: 33
End: 2023-12-29

## 2023-12-29 PROCEDURE — 88307 TISSUE EXAM BY PATHOLOGIST: CPT | Performed by: STUDENT IN AN ORGANIZED HEALTH CARE EDUCATION/TRAINING PROGRAM

## 2023-12-29 NOTE — TELEPHONE ENCOUNTER
Spoke to patient and let him know that final path was benign Patient was happy to hear this and appreciative of the call.

## 2024-01-04 PROBLEM — E89.0 STATUS POST TOTAL THYROIDECTOMY: Status: ACTIVE | Noted: 2023-10-24

## 2024-01-04 PROBLEM — Z90.89 STATUS POST TOTAL THYROIDECTOMY: Status: ACTIVE | Noted: 2023-10-24

## 2024-01-04 PROBLEM — Z98.890 STATUS POST TOTAL THYROIDECTOMY: Status: ACTIVE | Noted: 2023-10-24

## 2024-01-09 ENCOUNTER — TELEPHONE (OUTPATIENT)
Dept: SURGICAL ONCOLOGY | Facility: CLINIC | Age: 34
End: 2024-01-09

## 2024-01-09 ENCOUNTER — TELEPHONE (OUTPATIENT)
Dept: HEMATOLOGY ONCOLOGY | Facility: CLINIC | Age: 34
End: 2024-01-09

## 2024-01-09 NOTE — TELEPHONE ENCOUNTER
Spoke with patient about rescheduling appointment and gave him some dates and times that are available. He needs to find out when he can get a ride and then call us back to schedule. Patient has the hopeline number to do so.

## 2024-01-09 NOTE — TELEPHONE ENCOUNTER
Patient Call    Who are you speaking with? Patient    If it is not the patient, are they listed on an active communication consent form? N/A   What is the reason for this call? The patient would like to speak to Ila about his post op appt.    Does this require a call back? Yes   If a call back is required, please list best call back number [phone]   If a call back is required, advise that a message will be forwarded to their care team and someone will return their call as soon as possible.   Did you relay this information to the patient? Yes

## 2024-01-10 ENCOUNTER — OFFICE VISIT (OUTPATIENT)
Dept: SURGICAL ONCOLOGY | Facility: CLINIC | Age: 34
End: 2024-01-10

## 2024-01-10 VITALS
RESPIRATION RATE: 18 BRPM | WEIGHT: 160 LBS | HEART RATE: 81 BPM | SYSTOLIC BLOOD PRESSURE: 120 MMHG | HEIGHT: 73 IN | TEMPERATURE: 98.9 F | OXYGEN SATURATION: 99 % | DIASTOLIC BLOOD PRESSURE: 82 MMHG | BODY MASS INDEX: 21.2 KG/M2

## 2024-01-10 DIAGNOSIS — E89.0 STATUS POST TOTAL THYROIDECTOMY: Primary | ICD-10-CM

## 2024-01-10 PROCEDURE — 99024 POSTOP FOLLOW-UP VISIT: CPT | Performed by: SURGERY

## 2024-01-10 NOTE — PROGRESS NOTES
Surgical Oncology Follow Up       [facility] [place]  [address] [address]  [phone]    Radhames Halina  1990  [id number]  [facility] [place]  [address] [address]  [phone]    Chief Complaint   Patient presents with    Post-op     Patient being seen for post op. Total thyroidectomy 12/22/2023.       Assessment/Plan:    No problem-specific Assessment & Plan notes found for this encounter.       Diagnoses and all orders for this visit:    Status post total thyroidectomy      Advance Care Planning/Advance Directives:  Discussed disease status, cancer treatment plans and/or cancer treatment goals with the patient.     Oncology History    No history exists.       History of Present Illness: Patient is a 33-year-old man here for postop check status post total thyroidectomy.  -Interval History: No issues or complaints since surgery.    Review of Systems:  Review of Systems   Constitutional: Negative.    HENT: Negative.  Negative for trouble swallowing and voice change.    Eyes: Negative.    Respiratory: Negative.     Cardiovascular: Negative.    Gastrointestinal: Negative.    Endocrine: Negative.    Genitourinary: Negative.    Musculoskeletal: Negative.    Skin: Negative.    Allergic/Immunologic: Negative.    Neurological: Negative.    Hematological: Negative.    Psychiatric/Behavioral: Negative.     All other systems reviewed and are negative.      Patient Active Problem List   Diagnosis    Status post total thyroidectomy     Past Medical History:   Diagnosis Date    Lyme disease 06/2023     Past Surgical History:   Procedure Laterality Date    IR BIOPSY ABDOMEN  09/21/2023    [place] THYROIDECTOMY TOTAL/COMPLETE N/A 12/22/2023    Procedure: TOTAL THYROIDECTOMY;  Surgeon: Adrien Dsouza MD;  Location: [facility];  Service: Surgical Oncology     US GUIDED THYROID BIOPSY  08/17/2023    WISDOM TOOTH EXTRACTION       Family History   Problem Relation Age of Onset    No Known Problems Mother     No Known Problems Father     No Known Problems Brother     Thyroid disease unspecified Maternal Aunt         maybe overactive    Celiac disease Maternal Aunt     Thyroid disease unspecified Maternal Uncle     Thyroid disease unspecified Maternal Grandmother         1/2 thyroid removed     Social History     Socioeconomic History    Marital status: Single     Spouse name: Not on file    Number of children: Not on file    Years of education: Not on file    Highest education level: Not on file   Occupational History    Not on file   Tobacco Use    Smoking status: Never    Smokeless tobacco: Never   Vaping Use    Vaping status: Never Used   Substance and Sexual Activity    Alcohol use: Not Currently    Drug use: Yes     Types: Marijuana     Comment: occasional    Sexual activity: Not on file   Other Topics Concern    Not on file   Social History Narrative    Not on file     Social Determinants of Health     Financial Resource Strain: Not on file   Food Insecurity: Not on file   Transportation Needs: Not on file   Physical Activity: Not on file   Stress: Not on file   Social Connections: Not on file   Intimate Partner Violence: Not on file   Housing Stability: Not on file       Current Outpatient Medications:     levothyroxine (Synthroid) 125 mcg tablet, Take 1 tablet (125 mcg total) by mouth daily, Disp: 30 tablet, Rfl: 3    traMADol (Ultram) 50 mg tablet, Take 1 tablet (50 mg total) by mouth every 6 (six) hours as needed for moderate pain (Patient not taking: Reported on 1/10/2024), Disp: 10 tablet, Rfl: 0  No Known Allergies  Vitals:    01/10/24 1425   BP: 120/82   Pulse: 81   Resp: 18   Temp: 98.9 °F (37.2 °C)   SpO2: 99%       Physical Exam  Vitals reviewed.   Constitutional:       Appearance: Normal appearance.   Neck:      Comments: Incision clean dry  "intact  Musculoskeletal:      Cervical back: Normal range of motion and neck supple. No rigidity or tenderness.   Lymphadenopathy:      Cervical: No cervical adenopathy.   Neurological:      Mental Status: He is alert.           Results:  Labs:  No results found for: \"NNT7AGMMEVGQ\", \"TSH\", \"F4YULWM\", \"S4LIXKR\", \"THYROIDAB\"    Case Report   Surgical Pathology Report                         Case: [id number]                                   Authorizing Provider:  Adrien Dsouza MD        Collected:           12/22/2023 0901               Ordering Location[facility]     [facility]        Received:            12/22/2023 [address] [place] [address] Operating Room                                                       Pathologist:           Darling Barrera MD                                                         Specimen:    Thyroid, total thyroidectomy, stitch mary left lobe                                        Final Diagnosis   A. Thyroid and lymph node (1); total thyroidectomy:       - Benign thyroid with follicular nodular disease      - Background thyroid with previous biopsy site changes       - One lymph node, negative for malignancy (0/1)       - Negative for malignancy   Electronically signed by Darling Barrera MD on 12/29/2023 at 11:59 AM   Additional Information    Interpretation performed at [facility], [address] [place] [address]     All reported additional testing was performed with appropriately reactive controls.  These tests were developed and their performance characteristics determined by [facility] or appropriate performing facility, though some tests may be performed on tissues which have not been validated for performance characteristics (such as staining performed on alcohol exposed cell blocks and decalcified tissues).  Results should be interpreted with caution and in the context of the patients’ clinical condition. These tests may not " be cleared or approved by the U.S. Food and Drug Administration, though the FDA has determined that such clearance or approval is not necessary. These tests are used for clinical purposes and they should not be regarded as investigational or for research. This laboratory has been approved by CLIA 88, designated as a high-complexity laboratory and is qualified to perform these tests     Imaging  XR chest pa & lateral    Result Date: 12/13/2023  Narrative: CHEST INDICATION:   Nontoxic multinodular goiter. COMPARISON:  Comparison made with previous examination(s) dated (SR) 17-Aug-2023. EXAM PERFORMED/VIEWS:  XR CHEST PA & LATERAL FINDINGS: Cardiomediastinal silhouette appears unremarkable. The lungs are clear.  No pneumothorax or pleural effusion. Osseous structures appear within normal limits for patient age.     Impression: No acute cardiopulmonary disease. Electronically signed: 12/13/2023 12:10 PM Shirin Vasquez MD    I reviewed the above laboratory and imaging data.    Discussion/Summary: 33-year-old man status post total thyroidectomy for atrophic benign nodules.  Doing well.  No evidence of malignancy found.  Will plan on follow-up in 6 months with thyroid function panel to assess for thyroid function at that time.  All questions answered and copy of path report given him for his records.

## 2024-01-17 LAB
FT4I SERPL CALC-MCNC: 2.7 (ref 1.4–3.8)
T3RU NFR SERPL: 32 % (ref 22–35)
T4 SERPL-MCNC: 8.5 MCG/DL (ref 4.9–10.5)
TSH SERPL-ACNC: 5.15 MIU/L (ref 0.4–4.5)

## 2024-01-19 DIAGNOSIS — E04.2 MULTIPLE THYROID NODULES: ICD-10-CM

## 2024-01-19 RX ORDER — LEVOTHYROXINE SODIUM 0.12 MG/1
125 TABLET ORAL DAILY
Qty: 30 TABLET | Refills: 0 | Status: CANCELLED | OUTPATIENT
Start: 2024-01-19

## 2024-01-22 NOTE — TELEPHONE ENCOUNTER
Spoke to pharmacy, they state that patient picked up the refill yesterday. I LM for patient letting him know that his PCP or endocrinologist should take over the ordering of the levothyroxine after the initial refills. Teams number given for any questions.

## 2024-04-19 ENCOUNTER — OFFICE VISIT (OUTPATIENT)
Dept: ENDOCRINOLOGY | Facility: HOSPITAL | Age: 34
End: 2024-04-19
Payer: COMMERCIAL

## 2024-04-19 VITALS
HEIGHT: 73 IN | SYSTOLIC BLOOD PRESSURE: 116 MMHG | WEIGHT: 164 LBS | DIASTOLIC BLOOD PRESSURE: 64 MMHG | OXYGEN SATURATION: 99 % | HEART RATE: 60 BPM | BODY MASS INDEX: 21.74 KG/M2

## 2024-04-19 DIAGNOSIS — E04.2 MULTIPLE THYROID NODULES: ICD-10-CM

## 2024-04-19 DIAGNOSIS — E89.0 POSTOPERATIVE HYPOTHYROIDISM: Primary | ICD-10-CM

## 2024-04-19 PROCEDURE — 99214 OFFICE O/P EST MOD 30 MIN: CPT | Performed by: PHYSICIAN ASSISTANT

## 2024-04-19 RX ORDER — LEVOTHYROXINE SODIUM 0.12 MG/1
125 TABLET ORAL DAILY
Qty: 30 TABLET | Refills: 3 | Status: SHIPPED | OUTPATIENT
Start: 2024-04-19 | End: 2024-04-19 | Stop reason: SDUPTHER

## 2024-04-19 RX ORDER — LEVOTHYROXINE SODIUM 0.12 MG/1
125 TABLET ORAL DAILY
Qty: 90 TABLET | Refills: 1 | Status: SHIPPED | OUTPATIENT
Start: 2024-04-19

## 2024-04-19 NOTE — PATIENT INSTRUCTIONS
Continue with levothyroxine 125 mcg daily.    Get lab work completed.    Call with any concern or questions.    Follow up in 3 months with lab work prior to visit.

## 2024-04-19 NOTE — PROGRESS NOTES
Radhames Meade 33 y.o. male MRN: [id number]    Encounter: [id number]      Assessment/Plan     Assessment:  This is a 33 y.o.-year-old male with postoperative hypothyroidism.    Plan:  Postoperative hypothyroidism: Last set of thyroid lab work was completed January 2024 which did show a slightly elevated TSH with a normal free T4.  At this time I would like him to repeat TSH and free T4 to see if we need to make any adjustments to his thyroid medication.  In the meantime he can continue with levothyroxine 125 mcg daily.  We did discuss symptoms of hyperthyroidism and hypothyroidism.  At any point if he has any concerns with symptoms to contact the office.  At this time we will also have him follow-up in 3 months to make sure everything is stable, but I do expect things to progress nicely with for him and we will likely start extending out office visits.  Contact the office in the meantime with any concerns or questions.    Neuropathy: Did have concerns with his calcium level due to neuropathy.  Denies any symptoms at this time.  I did inform him that I can check calcium levels at this time and if there is any concerns with low levels, I can further investigate parathyroid glands.    CC: Hypothyroidism follow-up    History of Present Illness     HPI:  Radhames Meade is a 33-year-old male with postoperative hypothyroidism with history of multiple thyroid nodules senting for follow-up.  He was initially evaluated at our endocrine office October 2023 for evaluation of multiple thyroid nodules.  He had a thyroid ultrasound completed through PCP June 2023 which revealed 3 nodules with largest measuring 3.2 cm on the left lobe.  Subsequently underwent a thyroid biopsy completed August 2023.  Biopsies were sent out for Afirma testing and nodules came back [place] class IV with suspicion for malignancy.  He was then referred to surgical oncology and underwent total thyroidectomy December 2023.  Full biopsy of the thyroid  nodules came back with no evidence of malignancy.  He was started on levothyroxine 125 mcg daily after his surgery.  Did follow-up with surgical oncology January 10, 2024 and not having any complications at this time.  Is continuing to follow-up with them at this time.    Overall doing well today.  Is currently on levothyroxine 125 mcg daily.  Is taking medication appropriately.  Since having his thyroid removed he has not had the pressure sensation in his neck.  At this time denies any fatigue, heat or cold intolerance, palpitations, abdominal pain, diarrhea or constipation, tremors, anxiety or depression, insomnia.  Denies any neck discomfort at this time and seems to be doing well after the surgery.  Did have some sleeping issues shortly after the surgery, but again admits to none at this time.  Was having some numbness and tingling in his hands and feet about a month ago and did have some concerns of calcium levels.    Review of Systems   Constitutional:  Negative for activity change, appetite change, chills, diaphoresis, fatigue and unexpected weight change.   HENT:  Negative for sore throat, trouble swallowing and voice change.    Eyes:  Negative for visual disturbance.   Respiratory:  Negative for shortness of breath.    Cardiovascular:  Negative for palpitations and leg swelling.   Gastrointestinal:  Negative for abdominal pain, constipation and diarrhea.   Endocrine: Negative for cold intolerance, heat intolerance, polydipsia, polyphagia and polyuria.   Skin:  Negative for rash.   Neurological:  Negative for dizziness, tremors, weakness, light-headedness, numbness and headaches.   Hematological:  Negative for adenopathy.   Psychiatric/Behavioral:  Negative for dysphoric mood and sleep disturbance. The patient is not nervous/anxious.        Historical Information   Past Medical History:   Diagnosis Date   • Lyme disease 06/2023     Past Surgical History:   Procedure Laterality Date   • IR BIOPSY ABDOMEN   "09/21/2023   • [place] THYROIDECTOMY TOTAL/COMPLETE N/A 12/22/2023    Procedure: TOTAL THYROIDECTOMY;  Surgeon: Adrien Dsouza MD;  [facility];  Service: Surgical Oncology   • US GUIDED THYROID BIOPSY  08/17/2023   • WISDOM TOOTH EXTRACTION       Social History   Social History     Substance and Sexual Activity   Alcohol Use Not Currently     Social History     Substance and Sexual Activity   Drug Use Yes   • Types: Marijuana    Comment: occasional     Social History     Tobacco Use   Smoking Status Never   Smokeless Tobacco Never     Family History:   Family History   Problem Relation Age of Onset   • No Known Problems Mother    • No Known Problems Father    • No Known Problems Brother    • Thyroid disease unspecified Maternal Aunt         maybe overactive   • Celiac disease Maternal Aunt    • Thyroid disease unspecified Maternal Uncle    • Thyroid disease unspecified Maternal Grandmother         1/2 thyroid removed       Meds/Allergies   Current Outpatient Medications   Medication Sig Dispense Refill   • levothyroxine (Synthroid) 125 mcg tablet Take 1 tablet (125 mcg total) by mouth daily 90 tablet 1   • traMADol (Ultram) 50 mg tablet Take 1 tablet (50 mg total) by mouth every 6 (six) hours as needed for moderate pain (Patient not taking: Reported on 1/10/2024) 10 tablet 0     No current facility-administered medications for this visit.     No Known Allergies    Objective   Vitals: Blood pressure 116/64, pulse 60, height 6' 1\" (1.854 m), weight 74.4 kg (164 lb), SpO2 99%.    Physical Exam  Vitals and nursing note reviewed.   Constitutional:       General: He is not in acute distress.     Appearance: Normal appearance.   HENT:      Head: Normocephalic and atraumatic.   Eyes:      General: No scleral icterus.     Pupils: Pupils are equal, round, and reactive to light.   Neck:      Comments: Thyroid resected  Cardiovascular:      Rate and Rhythm: Normal rate and regular rhythm.      Heart sounds: No murmur " "heard.  Pulmonary:      Effort: Pulmonary effort is normal. No respiratory distress.      Breath sounds: Normal breath sounds. No wheezing.   Musculoskeletal:      Right lower leg: No edema.      Left lower leg: No edema.   Lymphadenopathy:      Cervical: No cervical adenopathy.   Skin:     General: Skin is warm and dry.   Neurological:      Mental Status: He is alert and oriented to person, place, and time. Mental status is at baseline.      Sensory: No sensory deficit.      Motor: No tremor.      Gait: Gait normal.      Deep Tendon Reflexes:      Reflex Scores:       Patellar reflexes are 2+ on the right side and 2+ on the left side.  Psychiatric:         Mood and Affect: Mood normal.         Behavior: Behavior normal.         Thought Content: Thought content normal.         The history was obtained from the review of the chart, patient.    Lab Results:   Lab Results   Component Value Date/Time    TSH W/RFX TO FREE T4 0.95 06/24/2023 10:45 AM       Imaging Studies:   Results for orders placed during the hospital encounter of 06/29/23    US thyroid    Impression  Left lower pole thyroid nodule meets current ACR criteria for recommending ultrasound-guided biopsy.    The 2 nodules in the right lobe warrant 1 year follow-up exam. However, given the suspicious features of the right mid gland nodule, consideration for biopsy of this nodule as well would be reasonable.        Reference: ACR Thyroid Imaging, Reporting and Data System (TI-RADS): White Paper of the ACR TI-RADS Committee. J AM Wai Radiol 2017;14:587-595. (additional recommendations based on American Thyroid Association 2015 guidelines.)    The study was marked in EPIC for significant notification.    Workstation performed: [id number]      I have personally reviewed pertinent reports.      Portions of the record may have been created with voice recognition software. Occasional wrong word or \"sound a like\" substitutions may have occurred due to the inherent " limitations of voice recognition software. Read the chart carefully and recognize, using context, where substitutions have occurred.

## 2024-04-28 LAB
ALBUMIN SERPL-MCNC: 4.9 G/DL (ref 3.6–5.1)
ALBUMIN/GLOB SERPL: 1.9 (CALC) (ref 1–2.5)
ALP SERPL-CCNC: 48 U/L (ref 36–130)
ALT SERPL-CCNC: 41 U/L (ref 9–46)
AST SERPL-CCNC: 38 U/L (ref 10–40)
BILIRUB SERPL-MCNC: 0.3 MG/DL (ref 0.2–1.2)
BUN SERPL-MCNC: 21 MG/DL (ref 7–25)
BUN/CREAT SERPL: ABNORMAL (CALC) (ref 6–22)
CALCIUM SERPL-MCNC: 9.5 MG/DL (ref 8.6–10.3)
CHLORIDE SERPL-SCNC: 103 MMOL/L (ref 98–110)
CO2 SERPL-SCNC: 28 MMOL/L (ref 20–32)
CREAT SERPL-MCNC: 0.84 MG/DL (ref 0.6–1.26)
GFR/BSA.PRED SERPLBLD CYS-BASED-ARV: 120 ML/MIN/1.73M2
GLOBULIN SER CALC-MCNC: 2.6 G/DL (CALC) (ref 1.9–3.7)
GLUCOSE SERPL-MCNC: 101 MG/DL (ref 65–99)
POTASSIUM SERPL-SCNC: 4.3 MMOL/L (ref 3.5–5.3)
PROT SERPL-MCNC: 7.5 G/DL (ref 6.1–8.1)
SODIUM SERPL-SCNC: 140 MMOL/L (ref 135–146)
T4 FREE SERPL-MCNC: 1.2 NG/DL (ref 0.8–1.8)
TSH SERPL-ACNC: 14.23 MIU/L (ref 0.4–4.5)

## 2024-04-29 ENCOUNTER — TELEPHONE (OUTPATIENT)
Dept: ENDOCRINOLOGY | Facility: HOSPITAL | Age: 34
End: 2024-04-29

## 2024-04-29 DIAGNOSIS — E04.2 MULTIPLE THYROID NODULES: ICD-10-CM

## 2024-04-29 DIAGNOSIS — E89.0 POSTOPERATIVE HYPOTHYROIDISM: Primary | ICD-10-CM

## 2024-04-29 RX ORDER — LEVOTHYROXINE SODIUM 0.12 MG/1
TABLET ORAL
Qty: 102 TABLET | Refills: 1 | Status: SHIPPED | OUTPATIENT
Start: 2024-04-29

## 2024-04-29 NOTE — TELEPHONE ENCOUNTER
----- Message from Mj Jurado PA-C sent at 4/29/2024  1:51 PM EDT -----  Most recent thyroid lab work came back with an elevated TSH and a normal free T4.  At this time it does appear that he needs more thyroid medication.  I like to increase his levothyroxine to 125 mcg 1 tablet 6 days a week and 2 tablets on Sunday.  I would like for him to repeat lab work in about 6 weeks.

## 2024-07-05 ENCOUNTER — TELEPHONE (OUTPATIENT)
Dept: SURGICAL ONCOLOGY | Facility: CLINIC | Age: 34
End: 2024-07-05

## 2024-07-05 NOTE — TELEPHONE ENCOUNTER
Called patient to reschedule his appointment with Xi on 7/15. After going over some dates, patient asked if she had anything open on a Thursday or a Friday. Let him know that Xi is not at the [facility] location on those days and that she is at the [facility] in [place]. Patient said he will call back to reschedule after he looks at his calendar.

## 2024-07-17 ENCOUNTER — TELEPHONE (OUTPATIENT)
Dept: SURGICAL ONCOLOGY | Facility: CLINIC | Age: 34
End: 2024-07-17

## 2024-07-17 NOTE — TELEPHONE ENCOUNTER
Called patient and left voice message. Patient's 7/15/24 appointment was cancelled. Called patient to reschedule follow up appointment in the office of FEDERICO Fulton. Gave patient office number to return call.

## 2024-08-08 ENCOUNTER — TELEPHONE (OUTPATIENT)
Dept: SURGICAL ONCOLOGY | Facility: CLINIC | Age: 34
End: 2024-08-08

## 2024-08-08 NOTE — TELEPHONE ENCOUNTER
Called patient and left a voice message. Patient cancelled July's appointment in My Chart. Scheduled patient's follow up with FEDERICO Fulton 8/19/24. Gave patient the

## 2024-09-25 DIAGNOSIS — E04.2 MULTIPLE THYROID NODULES: ICD-10-CM

## 2024-09-25 RX ORDER — LEVOTHYROXINE SODIUM 125 UG/1
TABLET ORAL
Qty: 102 TABLET | Refills: 1 | Status: SHIPPED | OUTPATIENT
Start: 2024-09-25

## 2025-03-15 DIAGNOSIS — E04.2 MULTIPLE THYROID NODULES: ICD-10-CM

## 2025-03-15 RX ORDER — LEVOTHYROXINE SODIUM 125 UG/1
TABLET ORAL
Qty: 102 TABLET | Refills: 0 | Status: CANCELLED | OUTPATIENT
Start: 2025-03-15

## 2025-03-19 DIAGNOSIS — E04.2 MULTIPLE THYROID NODULES: ICD-10-CM

## 2025-03-19 RX ORDER — LEVOTHYROXINE SODIUM 125 UG/1
TABLET ORAL
Qty: 68 TABLET | Refills: 0 | Status: SHIPPED | OUTPATIENT
Start: 2025-03-19

## 2025-03-19 RX ORDER — LEVOTHYROXINE SODIUM 125 UG/1
TABLET ORAL
Qty: 102 TABLET | Refills: 1 | OUTPATIENT
Start: 2025-03-19

## 2025-03-19 NOTE — TELEPHONE ENCOUNTER
Patient called in to make an appointment. Patient was advised we could not refill his medication as he has not been seen since 04/2024. Patient scheduled his appointment and aware we need labs.    Patient is asking if we can refill enough pills to cover until his appointment. Please advise.

## 2025-04-01 NOTE — TELEPHONE ENCOUNTER
LMOM and MCM requesing patient call to make endo appointment  
Please call patient to schedule a follow up appointment. He was last seen on 4/19/2024 and was to follow up in 3 months (July 2024)  
Pt scheduled for 4/25/25  
No

## 2025-04-25 ENCOUNTER — OFFICE VISIT (OUTPATIENT)
Dept: ENDOCRINOLOGY | Facility: HOSPITAL | Age: 35
End: 2025-04-25
Payer: COMMERCIAL

## 2025-04-25 VITALS
HEIGHT: 72 IN | WEIGHT: 160 LBS | SYSTOLIC BLOOD PRESSURE: 122 MMHG | BODY MASS INDEX: 21.67 KG/M2 | OXYGEN SATURATION: 98 % | HEART RATE: 64 BPM | DIASTOLIC BLOOD PRESSURE: 84 MMHG

## 2025-04-25 DIAGNOSIS — E89.0 POSTOPERATIVE HYPOTHYROIDISM: Primary | ICD-10-CM

## 2025-04-25 PROCEDURE — 99213 OFFICE O/P EST LOW 20 MIN: CPT | Performed by: PHYSICIAN ASSISTANT

## 2025-04-25 NOTE — PROGRESS NOTES
Name: Radhames Meade      : 1990      MRN: [id number]  Encounter Provider: Mj Jurado PA-C  Encounter Date: 2025   Encounter department: [facility]    No chief complaint on file.  :  Assessment & Plan  Postoperative hypothyroidism  Unfortunately no lab work was completed prior to today's office visit.  Clinically feels euthyroid.  At this time he will continue with levothyroxine 125 mcg 1 tablet 6 days a week and 2 tablets on .  He will get lab work at his earliest convenience and we will see if we need to make adjustments to medication.  Did discuss Steve concerns if thyroid levels remain too high or too low could lead to long-term complications.  He will contact the office if there is any concerns or questions.  Follow-up in 1 year with labwork completed prior to visit.  Orders:  •  TSH, 3rd generation; Future  •  T4, free; Future  •  TSH, 3rd generation; Future  •  T4, free; Future        History of Present Illness     Radhames Meade is a 34 y.o. male with postoperative hypothyroidism with history of multiple thyroid nodules senting for follow-up.  He was lost to follow-up and has not been seen since 2024.  He was initially evaluated at our endocrine office 2023 for evaluation of multiple thyroid nodules.  He had a thyroid ultrasound completed through PCP 2023 which revealed 3 nodules with largest measuring 3.2 cm on the left lobe.  Subsequently underwent a thyroid biopsy completed 2023.  Biopsies were sent out for Afirma testing and nodules came back [place] class IV with suspicion for malignancy.  He was then referred to surgical oncology and underwent total thyroidectomy 2023.  Full biopsy of the thyroid nodules came back with no evidence of malignancy.  He was started on levothyroxine 125 mcg daily after his surgery.  Did follow-up with surgical oncology January 10, 2024 and not having any  complications at this time.  Is continuing to follow-up with them at this time.     Overall doing well today.  Is currently on levothyroxine 125 mcg 1 tablet 6 days a week and 2 tablets on Sunday.  This was increased April 2024 after last office visit.  He did not have repeat lab work to verify dose adjustment.  Is taking medication appropriately.  States otherwise he is feeling well today.  Since having his thyroid removed he has not had the pressure sensation in his neck.  At this time denies any fatigue, heat or cold intolerance, palpitations, abdominal pain, diarrhea or constipation, tremors, anxiety or depression, insomnia.  Denies any neck discomfort at this time and seems to be doing well after the surgery.  Has neuropathy symptoms have resolved since last office visit.        Review of Systems   Constitutional:  Negative for activity change, appetite change, chills, diaphoresis, fatigue and unexpected weight change.   HENT:  Negative for sore throat, trouble swallowing and voice change.    Eyes:  Negative for visual disturbance.   Respiratory:  Negative for shortness of breath.    Cardiovascular:  Negative for palpitations and leg swelling.   Gastrointestinal:  Negative for abdominal pain, constipation and diarrhea.   Endocrine: Negative for cold intolerance, heat intolerance, polydipsia, polyphagia and polyuria.   Skin:  Negative for rash.   Neurological:  Negative for dizziness, tremors, weakness, light-headedness, numbness and headaches.   Hematological:  Negative for adenopathy.   Psychiatric/Behavioral:  Negative for dysphoric mood and sleep disturbance. The patient is not nervous/anxious.     as per HPI       Medical History Reviewed by provider this encounter:     .    Objective   /84   Pulse 64   Ht 6' (1.829 m)   Wt 72.6 kg (160 lb)   SpO2 98%   BMI 21.70 kg/m²      Body mass index is 21.7 kg/m².  Wt Readings from Last 3 Encounters:   04/25/25 72.6 kg (160 lb)   04/19/24 74.4 kg (164 lb)  "  01/10/24 72.6 kg (160 lb)     Physical Exam  Vitals and nursing note reviewed.   Constitutional:       General: He is not in acute distress.     Appearance: Normal appearance. He is well-developed. He is not diaphoretic.   Eyes:      General: No scleral icterus.     Extraocular Movements: Extraocular movements intact.      Conjunctiva/sclera: Conjunctivae normal.      Pupils: Pupils are equal, round, and reactive to light.   Neck:      Comments: Thyroid resected  Cardiovascular:      Rate and Rhythm: Normal rate and regular rhythm.      Pulses: Normal pulses.      Heart sounds: Normal heart sounds. No murmur heard.     No friction rub. No gallop.   Pulmonary:      Effort: Pulmonary effort is normal. No tachypnea, bradypnea or respiratory distress.      Breath sounds: Normal breath sounds. No wheezing.   Musculoskeletal:      Cervical back: Normal range of motion.      Right lower leg: No edema.      Left lower leg: No edema.   Lymphadenopathy:      Cervical: No cervical adenopathy.   Skin:     General: Skin is warm and dry.   Neurological:      Mental Status: He is alert and oriented to person, place, and time. Mental status is at baseline. He is not disoriented.      Motor: No abnormal muscle tone.      Gait: Gait normal.      Deep Tendon Reflexes: Reflexes are normal and symmetric.   Psychiatric:         Mood and Affect: Mood normal.         Behavior: Behavior normal.         Thought Content: Thought content normal.         Labs:   No results found for: \"HGBA1C\"  Lab Results   Component Value Date    CREATININE 0.84 04/27/2024    CREATININE 0.99 12/13/2023    CREATININE 0.88 06/24/2023    BUN 21 04/27/2024    K 4.3 04/27/2024     04/27/2024    CO2 28 04/27/2024     eGFR   Date Value Ref Range Status   04/27/2024 120 > OR = 60 mL/min/1.73m2 Final   12/13/2023 101 ml/min/1.73sq m Final     Lab Results   Component Value Date    HDL 67 06/24/2023    TRIG 39 06/24/2023     Lab Results   Component Value Date    " "ALT 41 04/27/2024    AST 38 04/27/2024    ALKPHOS 48 04/27/2024     No results found for: \"VLZ6ITHVAIFO\"  Lab Results   Component Value Date    FREET4 1.2 04/27/2024    TSI <89 10/28/2023       Patient Instructions   Continue with levothyroxine 125 mcg 1 TAB 6 DAYS A WEEK AND 2 TABS ON SUNDAY.     Get lab work completed.     Call with any concern or questions.     Follow up in 1 year with lab work prior to visit.    Discussed with the patient and all questioned fully answered. He will call me if any problems arise.      "

## 2025-04-25 NOTE — PATIENT INSTRUCTIONS
Continue with levothyroxine 125 mcg 1 TAB 6 DAYS A WEEK AND 2 TABS ON SUNDAY.     Get lab work completed.     Call with any concern or questions.     Follow up in 1 year with lab work prior to visit.

## 2025-05-17 ENCOUNTER — APPOINTMENT (OUTPATIENT)
Dept: LAB | Facility: HOSPITAL | Age: 35
End: 2025-05-17
Payer: COMMERCIAL

## 2025-05-19 ENCOUNTER — RESULTS FOLLOW-UP (OUTPATIENT)
Dept: ENDOCRINOLOGY | Facility: HOSPITAL | Age: 35
End: 2025-05-19

## 2025-05-19 DIAGNOSIS — E89.0 POSTOPERATIVE HYPOTHYROIDISM: Primary | ICD-10-CM

## 2025-05-19 RX ORDER — LEVOTHYROXINE SODIUM 150 UG/1
150 TABLET ORAL DAILY
Qty: 90 TABLET | Refills: 3 | Status: SHIPPED | OUTPATIENT
Start: 2025-05-19 | End: 2025-07-16

## 2025-07-12 ENCOUNTER — APPOINTMENT (OUTPATIENT)
Dept: LAB | Facility: HOSPITAL | Age: 35
End: 2025-07-12
Payer: COMMERCIAL

## (undated) DEVICE — CHLORAPREP HI-LITE 26ML ORANGE

## (undated) DEVICE — GLOVE INDICATOR PI UNDERGLOVE SZ 7 BLUE

## (undated) DEVICE — SUT SILK 2-0 18 IN A185H

## (undated) DEVICE — HARMONIC 1100 SHEARS, 20CM SHAFT LENGTH: Brand: HARMONIC

## (undated) DEVICE — NEEDLE 25G X 5/8IN

## (undated) DEVICE — BIPOLAR CORD DISP

## (undated) DEVICE — SURGICEL 4 X 8IN

## (undated) DEVICE — DRAPE SURGIKIT SADDLE BAG

## (undated) DEVICE — 3M™ STERI-STRIP™ COMPOUND BENZOIN TINCTURE 40 BAGS/CARTON 4 CARTONS/CASE C1544: Brand: 3M™ STERI-STRIP™

## (undated) DEVICE — SUT SILK 2-0 SH 30 IN K833H

## (undated) DEVICE — PACK UNIVERSAL NECK

## (undated) DEVICE — SUT SILK 3-0 18 IN A184H

## (undated) DEVICE — SUT MONOCRYL 5-0 P-3 18 IN Y493G

## (undated) DEVICE — THYROID SHEET: Brand: CONVERTORS

## (undated) DEVICE — INTENDED FOR TISSUE SEPARATION, AND OTHER PROCEDURES THAT REQUIRE A SHARP SURGICAL BLADE TO PUNCTURE OR CUT.: Brand: BARD-PARKER SAFETY BLADES SIZE 15, STERILE

## (undated) DEVICE — GLOVE SRG BIOGEL ECLIPSE 7

## (undated) DEVICE — SUT VICRYL 3-0 SH 27 IN J416H

## (undated) DEVICE — ELECTRODE BLADE MOD E-Z CLEAN  2.75IN 7CM -0012AM

## (undated) DEVICE — 3M™ STERI-STRIP™ REINFORCED ADHESIVE SKIN CLOSURES, R1547, 1/2 IN X 4 IN (12 MM X 100 MM), 6 STRIPS/ENVELOPE: Brand: 3M™ STERI-STRIP™

## (undated) DEVICE — SUT VICRYL 4-0 PS-2 27 IN J426H